# Patient Record
Sex: MALE | Race: WHITE | NOT HISPANIC OR LATINO | Employment: FULL TIME | URBAN - METROPOLITAN AREA
[De-identification: names, ages, dates, MRNs, and addresses within clinical notes are randomized per-mention and may not be internally consistent; named-entity substitution may affect disease eponyms.]

---

## 2023-02-14 PROBLEM — Z90.3 HISTORY OF SLEEVE GASTRECTOMY: Status: ACTIVE | Noted: 2023-02-14

## 2023-02-14 PROBLEM — E55.9 VITAMIN D DEFICIENCY: Status: ACTIVE | Noted: 2023-02-14

## 2023-02-14 PROBLEM — K21.9 GERD (GASTROESOPHAGEAL REFLUX DISEASE): Status: ACTIVE | Noted: 2023-02-14

## 2023-02-14 PROBLEM — R20.2 NUMBNESS AND TINGLING OF LOWER EXTREMITY: Status: ACTIVE | Noted: 2023-02-14

## 2023-02-14 PROBLEM — R20.0 NUMBNESS AND TINGLING OF LOWER EXTREMITY: Status: ACTIVE | Noted: 2023-02-14

## 2023-02-14 PROBLEM — J02.9 SORE THROAT: Status: ACTIVE | Noted: 2023-02-14

## 2023-02-14 PROBLEM — E66.01 MORBID OBESITY WITH BMI OF 40.0-44.9, ADULT (MULTI): Status: ACTIVE | Noted: 2023-02-14

## 2023-02-14 PROBLEM — J01.90 ACUTE SINUSITIS: Status: ACTIVE | Noted: 2023-02-14

## 2023-02-14 PROBLEM — I10 HTN (HYPERTENSION): Status: ACTIVE | Noted: 2023-02-14

## 2023-03-27 ENCOUNTER — OFFICE VISIT (OUTPATIENT)
Dept: PRIMARY CARE | Facility: CLINIC | Age: 43
End: 2023-03-27
Payer: COMMERCIAL

## 2023-03-27 ENCOUNTER — LAB (OUTPATIENT)
Dept: LAB | Facility: LAB | Age: 43
End: 2023-03-27
Payer: COMMERCIAL

## 2023-03-27 VITALS
RESPIRATION RATE: 20 BRPM | HEART RATE: 106 BPM | TEMPERATURE: 96.7 F | SYSTOLIC BLOOD PRESSURE: 138 MMHG | DIASTOLIC BLOOD PRESSURE: 70 MMHG | WEIGHT: 313.9 LBS | BODY MASS INDEX: 43.94 KG/M2 | HEIGHT: 71 IN

## 2023-03-27 DIAGNOSIS — Z13.1 SCREENING FOR DIABETES MELLITUS: Primary | ICD-10-CM

## 2023-03-27 DIAGNOSIS — F32.1 CURRENT MODERATE EPISODE OF MAJOR DEPRESSIVE DISORDER WITHOUT PRIOR EPISODE (MULTI): ICD-10-CM

## 2023-03-27 DIAGNOSIS — Z00.00 HEALTH MAINTENANCE EXAMINATION: ICD-10-CM

## 2023-03-27 DIAGNOSIS — Z00.00 WELL ADULT EXAM: ICD-10-CM

## 2023-03-27 DIAGNOSIS — Z13.220 SCREENING CHOLESTEROL LEVEL: ICD-10-CM

## 2023-03-27 DIAGNOSIS — Z13.1 SCREENING FOR DIABETES MELLITUS: ICD-10-CM

## 2023-03-27 DIAGNOSIS — N50.89 ENLARGED TESTICLE: ICD-10-CM

## 2023-03-27 DIAGNOSIS — Z23 IMMUNIZATION DUE: ICD-10-CM

## 2023-03-27 LAB
CHOLESTEROL (MG/DL) IN SER/PLAS: 202 MG/DL (ref 0–199)
CHOLESTEROL IN HDL (MG/DL) IN SER/PLAS: 46.8 MG/DL
CHOLESTEROL/HDL RATIO: 4.3
GLUCOSE (MG/DL) IN SER/PLAS: 88 MG/DL (ref 74–99)
LDL: 131 MG/DL (ref 0–99)
TRIGLYCERIDE (MG/DL) IN SER/PLAS: 123 MG/DL (ref 0–149)
VLDL: 25 MG/DL (ref 0–40)

## 2023-03-27 PROCEDURE — 99396 PREV VISIT EST AGE 40-64: CPT | Performed by: FAMILY MEDICINE

## 2023-03-27 PROCEDURE — 90715 TDAP VACCINE 7 YRS/> IM: CPT | Performed by: FAMILY MEDICINE

## 2023-03-27 PROCEDURE — 99213 OFFICE O/P EST LOW 20 MIN: CPT | Performed by: FAMILY MEDICINE

## 2023-03-27 PROCEDURE — 1036F TOBACCO NON-USER: CPT | Performed by: FAMILY MEDICINE

## 2023-03-27 PROCEDURE — 80323 ALKALOIDS NOS: CPT

## 2023-03-27 PROCEDURE — 90471 IMMUNIZATION ADMIN: CPT | Performed by: FAMILY MEDICINE

## 2023-03-27 PROCEDURE — 82947 ASSAY GLUCOSE BLOOD QUANT: CPT

## 2023-03-27 PROCEDURE — 80061 LIPID PANEL: CPT

## 2023-03-27 PROCEDURE — 3078F DIAST BP <80 MM HG: CPT | Performed by: FAMILY MEDICINE

## 2023-03-27 PROCEDURE — 36415 COLL VENOUS BLD VENIPUNCTURE: CPT

## 2023-03-27 PROCEDURE — 3075F SYST BP GE 130 - 139MM HG: CPT | Performed by: FAMILY MEDICINE

## 2023-03-27 RX ORDER — SERTRALINE HYDROCHLORIDE 50 MG/1
50 TABLET, FILM COATED ORAL DAILY
Qty: 30 TABLET | Refills: 1 | Status: SHIPPED | OUTPATIENT
Start: 2023-03-27 | End: 2023-04-27 | Stop reason: SDUPTHER

## 2023-03-27 RX ORDER — OMEPRAZOLE 20 MG/1
20 TABLET, DELAYED RELEASE ORAL
COMMUNITY

## 2023-03-27 ASSESSMENT — PATIENT HEALTH QUESTIONNAIRE - PHQ9
SUM OF ALL RESPONSES TO PHQ9 QUESTIONS 1 & 2: 3
4. FEELING TIRED OR HAVING LITTLE ENERGY: MORE THAN HALF THE DAYS
SUM OF ALL RESPONSES TO PHQ QUESTIONS 1-9: 17
2. FEELING DOWN, DEPRESSED OR HOPELESS: NOT AT ALL
5. POOR APPETITE OR OVEREATING: NEARLY EVERY DAY
3. TROUBLE FALLING OR STAYING ASLEEP: NEARLY EVERY DAY
7. TROUBLE CONCENTRATING ON THINGS, SUCH AS READING THE NEWSPAPER OR WATCHING TELEVISION: NOT AT ALL
10. IF YOU CHECKED OFF ANY PROBLEMS, HOW DIFFICULT HAVE THESE PROBLEMS MADE IT FOR YOU TO DO YOUR WORK, TAKE CARE OF THINGS AT HOME, OR GET ALONG WITH OTHER PEOPLE: NOT DIFFICULT AT ALL
6. FEELING BAD ABOUT YOURSELF - OR THAT YOU ARE A FAILURE OR HAVE LET YOURSELF OR YOUR FAMILY DOWN: NEARLY EVERY DAY
9. THOUGHTS THAT YOU WOULD BE BETTER OFF DEAD, OR OF HURTING YOURSELF: NEARLY EVERY DAY
1. LITTLE INTEREST OR PLEASURE IN DOING THINGS: NEARLY EVERY DAY
8. MOVING OR SPEAKING SO SLOWLY THAT OTHER PEOPLE COULD HAVE NOTICED. OR THE OPPOSITE, BEING SO FIGETY OR RESTLESS THAT YOU HAVE BEEN MOVING AROUND A LOT MORE THAN USUAL: NOT AT ALL

## 2023-03-27 ASSESSMENT — ENCOUNTER SYMPTOMS
THOUGHTS THAT DEATH WOULD BE EASIER: 1
ANHEDONIA: 1
DEPRESSION: 1

## 2023-03-27 NOTE — PROGRESS NOTES
"Subjective   Patient ID: Chuck Dunn is a 42 y.o. male who presents for Annual Exam.    Does not feel would act on thought and has had off and off whole life. No plan  Took Paxil and lithium when at 18 yo but not for a long time Has had depression sx on and off sincere including the thoughts of better off not being here but never a plan . Denies manic episodes. Feels left testicle larger since October  no pain    Well Adult Physical   Patient here for a comprehensive physical exam.The patient reports problems - depression and left testicle     Do you take any herbs or supplements that were not prescribed by a doctor? yes   Are you taking calcium supplements? no   Are you taking aspirin daily? no          Depression  Visit Type: initial  Onset of symptoms: more than 1 year ago  Progression since onset: waxing and waning  Patient presents with the following symptoms: anhedonia, suicidal ideas and thoughts of death.  Patient is not experiencing: suicidal planning.       waist circumference 48 inches    Review of Systems   Psychiatric/Behavioral:  Positive for depression and suicidal ideas.        Objective   /70   Pulse 106   Temp 35.9 °C (96.7 °F) (Temporal)   Resp 20   Ht 1.803 m (5' 11\")   Wt (!) 142 kg (313 lb 14.4 oz)   BMI 43.78 kg/m²     Physical Exam  Vitals and nursing note reviewed.   Constitutional:       Appearance: Normal appearance.   HENT:      Head: Normocephalic and atraumatic.      Right Ear: Tympanic membrane, ear canal and external ear normal.      Left Ear: Tympanic membrane, ear canal and external ear normal.      Nose: Nose normal.      Mouth/Throat:      Mouth: Mucous membranes are moist.   Eyes:      Conjunctiva/sclera: Conjunctivae normal.   Neck:      Thyroid: No thyroid mass or thyromegaly.   Cardiovascular:      Rate and Rhythm: Normal rate and regular rhythm.      Pulses: Normal pulses.      Heart sounds: Normal heart sounds.   Pulmonary:      Effort: Pulmonary effort " is normal.      Breath sounds: Normal breath sounds. No wheezing.   Abdominal:      General: Abdomen is flat. Bowel sounds are normal.      Palpations: Abdomen is soft. There is no mass.   Genitourinary:     Penis: Normal.       Testes:         Right: Tenderness or swelling not present.         Left: Swelling present.      Comments: Left larger right  Musculoskeletal:         General: Normal range of motion.      Cervical back: Neck supple.   Skin:     General: Skin is warm and dry.      Findings: No rash.   Neurological:      General: No focal deficit present.      Mental Status: He is alert and oriented to person, place, and time.   Psychiatric:         Mood and Affect: Mood normal.         Behavior: Behavior normal.         Thought Content: Thought content normal.         Judgment: Judgment normal.         Assessment/Plan   Problem List Items Addressed This Visit    None  Visit Diagnoses       Screening for diabetes mellitus    -  Primary    Relevant Orders    Glucose    Screening cholesterol level        Relevant Orders    Lipid Panel    Health maintenance examination        Relevant Orders    Nicotine + Metabolites, Urine    Well adult exam        Current moderate episode of major depressive disorder without prior episode (CMS/HCC)        Relevant Medications    sertraline (Zoloft) 50 mg tablet    Other Relevant Orders    Follow Up In [facility] - PCP    Immunization due        Relevant Orders    Tdap vaccine, age 10 years and older (BOOSTRIX) (Completed)    Enlarged testicle        Relevant Orders    US scrotum

## 2023-03-27 NOTE — PATIENT INSTRUCTIONS
Monitor your blood pressure and call if it more than 140/90.   High Blood Pressure: Eating Foods Low in Salt: Brief Version    If you have high blood pressure, cutting down on salt can help lower it. Sodium is the part of salt that causes the problems. You should have no more than 2300 milligrams of sodium a day. One teaspoon of salt has about 2300 milligrams of sodium.     Our taste for salt is mainly a habit. When you use less salt, your taste starts to change. After a while, food tastes better without salt than it did with it.     You can use less salt.     There are two main ways to use less salt:     Do not add salt to your foods.  Choose foods that have less salt. Read the labels on canned and prepared foods.      If you need to eat very little salt, you may need help in planning your meals. Talk to your health care provider or dietitian. Remember there are many healthy ways to add taste without adding salt.     You can use less salt by doing these things:     Read labels carefully. Look for any form of salt or sodium (another word for salt). Remember that baking soda, MSG, and baking powder have sodium, too. Do not use foods that have too much sodium.  Add very little or no salt to the food you make.  Do not add salt to food at the table.  Watch what you eat when you eat out. Fast foods are often very high in salt, as are many other restaurant foods.      When cooking or preparing foods, stay away from:     Ketchup, prepared mustard, pickles, and olives.  Soy sauce, steak or barbecue sauce, chili sauce, or Worcestershire sauce.  Bottled salad dressings.  Bouillon cubes.  Self-rising flour and biscuit mixes.      Don't eat foods high in salt, such as:     Cured meats or fish (for example, jain, luncheon meats, and canned sardines).  Canned vegetables, soups, and other packaged foods.  Cheeses and buttermilk.  Salted nuts and peanut butter.  Salted crackers, chips, popcorn, and pretzels.  Instant cooked  cereals.      You can get many of these foods with no or low salt. Read the labels. You may also want to try some of the many frozen low-salt and low-fat dinners.     Ask your health care provider about using salt substitutes. Many salt substitutes have potassium. You may need to watch how much potassium you use.     You can learn to cook without using salt.     You can be creative and make food look and taste great. It's a good idea to eat fresh foods as much as you can. Also, plain frozen fruits and vegetables usually do not have added salt.     Instead of salt, there are many kinds of things you can use to flavor your foods. You can try:     Mosley, cilantro, cumin, or george.  Thyme, gregory, bay leaf, or oregano.  Onions, garlic, mushrooms, or tomatoes.  Orange, lemon, lime juice, or wine.      Here are some other great suggestions:     If you use canned products, use the low-salt types. Rinse canned vegetables with tap water before cooking.  Use unsalted margarine instead of regular margarine or butter.  Eat tuna and salmon. Rinse first with running water.      Take care of yourself.     Find out more about eating healthy foods. You can:     Go to the library.  Look at your bookstore for low-salt cookbooks.  Remember to read food labels to find out how much salt and fat are used.      Take time to plan and enjoy your meals. It can be fun to learn to cook new dishes. And it's great to know that when you use less salt, you will lower your blood pressure

## 2023-04-01 LAB
3-OH-COTININE, URINE: <50 NG/ML
ANABASINE, URINE: <5 NG/ML
COTININE, URINE: <15 NG/ML
NICOTINE, URINE: <15 NG/ML

## 2023-04-27 ENCOUNTER — LAB (OUTPATIENT)
Dept: LAB | Facility: LAB | Age: 43
End: 2023-04-27
Payer: COMMERCIAL

## 2023-04-27 ENCOUNTER — OFFICE VISIT (OUTPATIENT)
Dept: PRIMARY CARE | Facility: CLINIC | Age: 43
End: 2023-04-27
Payer: COMMERCIAL

## 2023-04-27 VITALS
BODY MASS INDEX: 43.24 KG/M2 | RESPIRATION RATE: 16 BRPM | DIASTOLIC BLOOD PRESSURE: 108 MMHG | HEART RATE: 82 BPM | TEMPERATURE: 97.1 F | WEIGHT: 310 LBS | SYSTOLIC BLOOD PRESSURE: 150 MMHG

## 2023-04-27 DIAGNOSIS — F32.1 CURRENT MODERATE EPISODE OF MAJOR DEPRESSIVE DISORDER WITHOUT PRIOR EPISODE (MULTI): ICD-10-CM

## 2023-04-27 DIAGNOSIS — I10 PRIMARY HYPERTENSION: ICD-10-CM

## 2023-04-27 DIAGNOSIS — I10 PRIMARY HYPERTENSION: Primary | ICD-10-CM

## 2023-04-27 DIAGNOSIS — F32.0 CURRENT MILD EPISODE OF MAJOR DEPRESSIVE DISORDER WITHOUT PRIOR EPISODE (CMS-HCC): ICD-10-CM

## 2023-04-27 LAB
ALANINE AMINOTRANSFERASE (SGPT) (U/L) IN SER/PLAS: 42 U/L (ref 10–52)
ALBUMIN (G/DL) IN SER/PLAS: 4.6 G/DL (ref 3.4–5)
ALKALINE PHOSPHATASE (U/L) IN SER/PLAS: 44 U/L (ref 33–120)
ANION GAP IN SER/PLAS: 12 MMOL/L (ref 10–20)
ASPARTATE AMINOTRANSFERASE (SGOT) (U/L) IN SER/PLAS: 25 U/L (ref 9–39)
BILIRUBIN TOTAL (MG/DL) IN SER/PLAS: 0.6 MG/DL (ref 0–1.2)
CALCIUM (MG/DL) IN SER/PLAS: 9.8 MG/DL (ref 8.6–10.3)
CARBON DIOXIDE, TOTAL (MMOL/L) IN SER/PLAS: 31 MMOL/L (ref 21–32)
CHLORIDE (MMOL/L) IN SER/PLAS: 102 MMOL/L (ref 98–107)
CREATININE (MG/DL) IN SER/PLAS: 0.86 MG/DL (ref 0.5–1.3)
ERYTHROCYTE DISTRIBUTION WIDTH (RATIO) BY AUTOMATED COUNT: 13.2 % (ref 11.5–14.5)
ERYTHROCYTE MEAN CORPUSCULAR HEMOGLOBIN CONCENTRATION (G/DL) BY AUTOMATED: 32.2 G/DL (ref 32–36)
ERYTHROCYTE MEAN CORPUSCULAR VOLUME (FL) BY AUTOMATED COUNT: 92 FL (ref 80–100)
ERYTHROCYTES (10*6/UL) IN BLOOD BY AUTOMATED COUNT: 5.27 X10E12/L (ref 4.5–5.9)
GFR MALE: >90 ML/MIN/1.73M2
GLUCOSE (MG/DL) IN SER/PLAS: 90 MG/DL (ref 74–99)
HEMATOCRIT (%) IN BLOOD BY AUTOMATED COUNT: 48.4 % (ref 41–52)
HEMOGLOBIN (G/DL) IN BLOOD: 15.6 G/DL (ref 13.5–17.5)
LEUKOCYTES (10*3/UL) IN BLOOD BY AUTOMATED COUNT: 7.1 X10E9/L (ref 4.4–11.3)
PLATELETS (10*3/UL) IN BLOOD AUTOMATED COUNT: 252 X10E9/L (ref 150–450)
POTASSIUM (MMOL/L) IN SER/PLAS: 4.6 MMOL/L (ref 3.5–5.3)
PROTEIN TOTAL: 7.1 G/DL (ref 6.4–8.2)
SODIUM (MMOL/L) IN SER/PLAS: 140 MMOL/L (ref 136–145)
UREA NITROGEN (MG/DL) IN SER/PLAS: 13 MG/DL (ref 6–23)

## 2023-04-27 PROCEDURE — 85027 COMPLETE CBC AUTOMATED: CPT

## 2023-04-27 PROCEDURE — 3077F SYST BP >= 140 MM HG: CPT | Performed by: FAMILY MEDICINE

## 2023-04-27 PROCEDURE — 1036F TOBACCO NON-USER: CPT | Performed by: FAMILY MEDICINE

## 2023-04-27 PROCEDURE — 99214 OFFICE O/P EST MOD 30 MIN: CPT | Performed by: FAMILY MEDICINE

## 2023-04-27 PROCEDURE — 80053 COMPREHEN METABOLIC PANEL: CPT

## 2023-04-27 PROCEDURE — 3080F DIAST BP >= 90 MM HG: CPT | Performed by: FAMILY MEDICINE

## 2023-04-27 PROCEDURE — 36415 COLL VENOUS BLD VENIPUNCTURE: CPT

## 2023-04-27 RX ORDER — SERTRALINE HYDROCHLORIDE 50 MG/1
50 TABLET, FILM COATED ORAL DAILY
Qty: 90 TABLET | Refills: 1 | Status: SHIPPED | OUTPATIENT
Start: 2023-04-27 | End: 2023-09-05 | Stop reason: ALTCHOICE

## 2023-04-27 RX ORDER — LISINOPRIL AND HYDROCHLOROTHIAZIDE 10; 12.5 MG/1; MG/1
1 TABLET ORAL DAILY
Qty: 30 TABLET | Refills: 5 | Status: SHIPPED | OUTPATIENT
Start: 2023-04-27 | End: 2023-05-22 | Stop reason: SINTOL

## 2023-04-27 ASSESSMENT — ENCOUNTER SYMPTOMS: HYPERTENSION: 1

## 2023-04-27 NOTE — PROGRESS NOTES
Subjective   Patient ID: Toni Dunn is a 42 y.o. male who presents for Hypertension (Bp with home monitor = 176/112,  zoloft taking the edge off).    Doing well on sertraline and wants to stay on this dose  has been on BP meds in past before bariatric surgery in 2015,  BP high at home     Hypertension  This is a recurrent problem. The current episode started 1 to 4 weeks ago. The problem is unchanged. The problem is uncontrolled. Past treatments include ACE inhibitors and diuretics. There are no compliance problems.         Review of Systems    Objective   BP (!) 150/108 (BP Location: Right arm, Patient Position: Sitting, BP Cuff Size: Large adult)   Pulse 82   Temp 36.2 °C (97.1 °F) (Temporal)   Resp 16   Wt 141 kg (310 lb)   BMI 43.24 kg/m²     Physical Exam  Vitals and nursing note reviewed.   Constitutional:       General: He is not in acute distress.     Appearance: He is not ill-appearing.   HENT:      Head: Normocephalic and atraumatic.      Mouth/Throat:      Mouth: Mucous membranes are moist.   Eyes:      Conjunctiva/sclera: Conjunctivae normal.   Cardiovascular:      Rate and Rhythm: Normal rate and regular rhythm.      Heart sounds: Normal heart sounds.   Pulmonary:      Effort: Pulmonary effort is normal.      Breath sounds: Normal breath sounds.   Skin:     General: Skin is warm.   Neurological:      Mental Status: He is alert.   Psychiatric:         Mood and Affect: Mood normal.         Thought Content: Thought content normal.         Judgment: Judgment normal.         Assessment/Plan   Problem List Items Addressed This Visit          Circulatory    HTN (hypertension) - Primary    Relevant Medications    lisinopriL-hydrochlorothiazide 10-12.5 mg tablet    Other Relevant Orders    CBC    Comprehensive Metabolic Panel    Follow Up In [facility] - PCP       Other    Current mild episode of major depressive disorder (CMS/HCC)     Doing well on meds           Other Visit Diagnoses        Current moderate episode of major depressive disorder without prior episode (CMS/HCC)        Relevant Medications    sertraline (Zoloft) 50 mg tablet

## 2023-05-22 ENCOUNTER — OFFICE VISIT (OUTPATIENT)
Dept: PRIMARY CARE | Facility: CLINIC | Age: 43
End: 2023-05-22
Payer: COMMERCIAL

## 2023-05-22 VITALS
HEART RATE: 88 BPM | RESPIRATION RATE: 12 BRPM | DIASTOLIC BLOOD PRESSURE: 88 MMHG | TEMPERATURE: 97.8 F | BODY MASS INDEX: 44 KG/M2 | WEIGHT: 315 LBS | SYSTOLIC BLOOD PRESSURE: 118 MMHG

## 2023-05-22 DIAGNOSIS — I10 PRIMARY HYPERTENSION: ICD-10-CM

## 2023-05-22 DIAGNOSIS — F32.0 CURRENT MILD EPISODE OF MAJOR DEPRESSIVE DISORDER WITHOUT PRIOR EPISODE (CMS-HCC): Primary | ICD-10-CM

## 2023-05-22 PROCEDURE — 3079F DIAST BP 80-89 MM HG: CPT | Performed by: FAMILY MEDICINE

## 2023-05-22 PROCEDURE — 1036F TOBACCO NON-USER: CPT | Performed by: FAMILY MEDICINE

## 2023-05-22 PROCEDURE — 3074F SYST BP LT 130 MM HG: CPT | Performed by: FAMILY MEDICINE

## 2023-05-22 PROCEDURE — 99213 OFFICE O/P EST LOW 20 MIN: CPT | Performed by: FAMILY MEDICINE

## 2023-05-22 RX ORDER — LOSARTAN POTASSIUM AND HYDROCHLOROTHIAZIDE 12.5; 5 MG/1; MG/1
1 TABLET ORAL DAILY
Qty: 30 TABLET | Refills: 2 | Status: SHIPPED | OUTPATIENT
Start: 2023-05-22 | End: 2023-06-12

## 2023-05-22 ASSESSMENT — PATIENT HEALTH QUESTIONNAIRE - PHQ9
1. LITTLE INTEREST OR PLEASURE IN DOING THINGS: NEARLY EVERY DAY
6. FEELING BAD ABOUT YOURSELF - OR THAT YOU ARE A FAILURE OR HAVE LET YOURSELF OR YOUR FAMILY DOWN: NEARLY EVERY DAY
2. FEELING DOWN, DEPRESSED OR HOPELESS: NEARLY EVERY DAY
10. IF YOU CHECKED OFF ANY PROBLEMS, HOW DIFFICULT HAVE THESE PROBLEMS MADE IT FOR YOU TO DO YOUR WORK, TAKE CARE OF THINGS AT HOME, OR GET ALONG WITH OTHER PEOPLE: NOT DIFFICULT AT ALL
SUM OF ALL RESPONSES TO PHQ9 QUESTIONS 1 & 2: 6
SUM OF ALL RESPONSES TO PHQ QUESTIONS 1-9: 17
7. TROUBLE CONCENTRATING ON THINGS, SUCH AS READING THE NEWSPAPER OR WATCHING TELEVISION: SEVERAL DAYS
3. TROUBLE FALLING OR STAYING ASLEEP: NOT AT ALL
8. MOVING OR SPEAKING SO SLOWLY THAT OTHER PEOPLE COULD HAVE NOTICED. OR THE OPPOSITE, BEING SO FIGETY OR RESTLESS THAT YOU HAVE BEEN MOVING AROUND A LOT MORE THAN USUAL: NOT AT ALL
9. THOUGHTS THAT YOU WOULD BE BETTER OFF DEAD, OR OF HURTING YOURSELF: SEVERAL DAYS
5. POOR APPETITE OR OVEREATING: NEARLY EVERY DAY
4. FEELING TIRED OR HAVING LITTLE ENERGY: NEARLY EVERY DAY

## 2023-05-22 NOTE — PROGRESS NOTES
Subjective   Patient ID: Toni Dunn is a 42 y.o. male who presents for Medication Visit (Started lisinopril last month, URI s/s but feels much better today).    Having ED issues which he feels is from lisinopril          Review of Systems    Objective   /88 (BP Location: Right arm, Patient Position: Sitting, BP Cuff Size: Large adult)   Pulse 88   Temp 36.6 °C (97.8 °F) (Temporal)   Resp 12   Wt 143 kg (315 lb 8 oz)   BMI 44.00 kg/m²     Physical Exam  Vitals and nursing note reviewed.   Constitutional:       General: He is not in acute distress.     Appearance: He is not ill-appearing.   HENT:      Head: Normocephalic and atraumatic.      Mouth/Throat:      Mouth: Mucous membranes are moist.   Eyes:      Conjunctiva/sclera: Conjunctivae normal.   Cardiovascular:      Rate and Rhythm: Normal rate and regular rhythm.      Heart sounds: Normal heart sounds.   Pulmonary:      Effort: Pulmonary effort is normal.      Breath sounds: Normal breath sounds.   Skin:     General: Skin is warm.   Neurological:      Mental Status: He is alert.   Psychiatric:         Mood and Affect: Mood normal.         Thought Content: Thought content normal.         Judgment: Judgment normal.         Assessment/Plan   Problem List Items Addressed This Visit          Circulatory    HTN (hypertension)    Relevant Medications    losartan-hydrochlorothiazide (Hyzaar) 50-12.5 mg tablet       Other    Current mild episode of major depressive disorder (CMS/HCC) - Primary

## 2023-06-12 ENCOUNTER — OFFICE VISIT (OUTPATIENT)
Dept: PRIMARY CARE | Facility: CLINIC | Age: 43
End: 2023-06-12
Payer: COMMERCIAL

## 2023-06-12 VITALS
BODY MASS INDEX: 44.52 KG/M2 | TEMPERATURE: 97.3 F | RESPIRATION RATE: 16 BRPM | HEART RATE: 92 BPM | DIASTOLIC BLOOD PRESSURE: 90 MMHG | SYSTOLIC BLOOD PRESSURE: 122 MMHG | WEIGHT: 315 LBS

## 2023-06-12 DIAGNOSIS — I10 PRIMARY HYPERTENSION: ICD-10-CM

## 2023-06-12 PROCEDURE — 3080F DIAST BP >= 90 MM HG: CPT | Performed by: FAMILY MEDICINE

## 2023-06-12 PROCEDURE — 1036F TOBACCO NON-USER: CPT | Performed by: FAMILY MEDICINE

## 2023-06-12 PROCEDURE — 3074F SYST BP LT 130 MM HG: CPT | Performed by: FAMILY MEDICINE

## 2023-06-12 PROCEDURE — 99213 OFFICE O/P EST LOW 20 MIN: CPT | Performed by: FAMILY MEDICINE

## 2023-06-12 RX ORDER — LOSARTAN POTASSIUM AND HYDROCHLOROTHIAZIDE 12.5; 1 MG/1; MG/1
1 TABLET ORAL DAILY
Qty: 30 TABLET | Refills: 2 | Status: SHIPPED | OUTPATIENT
Start: 2023-06-12 | End: 2023-07-14 | Stop reason: SDUPTHER

## 2023-06-12 ASSESSMENT — PATIENT HEALTH QUESTIONNAIRE - PHQ9
6. FEELING BAD ABOUT YOURSELF - OR THAT YOU ARE A FAILURE OR HAVE LET YOURSELF OR YOUR FAMILY DOWN: NEARLY EVERY DAY
SUM OF ALL RESPONSES TO PHQ9 QUESTIONS 1 & 2: 4
3. TROUBLE FALLING OR STAYING ASLEEP: NOT AT ALL
9. THOUGHTS THAT YOU WOULD BE BETTER OFF DEAD, OR OF HURTING YOURSELF: SEVERAL DAYS
4. FEELING TIRED OR HAVING LITTLE ENERGY: SEVERAL DAYS
2. FEELING DOWN, DEPRESSED OR HOPELESS: SEVERAL DAYS
10. IF YOU CHECKED OFF ANY PROBLEMS, HOW DIFFICULT HAVE THESE PROBLEMS MADE IT FOR YOU TO DO YOUR WORK, TAKE CARE OF THINGS AT HOME, OR GET ALONG WITH OTHER PEOPLE: NOT DIFFICULT AT ALL
1. LITTLE INTEREST OR PLEASURE IN DOING THINGS: NEARLY EVERY DAY
8. MOVING OR SPEAKING SO SLOWLY THAT OTHER PEOPLE COULD HAVE NOTICED. OR THE OPPOSITE, BEING SO FIGETY OR RESTLESS THAT YOU HAVE BEEN MOVING AROUND A LOT MORE THAN USUAL: NOT AT ALL
SUM OF ALL RESPONSES TO PHQ QUESTIONS 1-9: 12
5. POOR APPETITE OR OVEREATING: NEARLY EVERY DAY
7. TROUBLE CONCENTRATING ON THINGS, SUCH AS READING THE NEWSPAPER OR WATCHING TELEVISION: NOT AT ALL

## 2023-06-12 NOTE — PROGRESS NOTES
Subjective   Patient ID: Toni Dunn is a 42 y.o. male who presents for Hypertension (At  last visit started losartan / hydrochlorothiazide denies any proplems).    HPI     Review of Systems    Objective   /90 (BP Location: Right arm, Patient Position: Sitting, BP Cuff Size: Large adult)   Pulse 92   Temp 36.3 °C (97.3 °F) (Temporal)   Resp 16   Wt 145 kg (319 lb 3.2 oz)   BMI 44.52 kg/m²     Physical Exam  Vitals and nursing note reviewed.   Constitutional:       General: He is not in acute distress.     Appearance: He is not ill-appearing.   HENT:      Head: Normocephalic and atraumatic.      Mouth/Throat:      Mouth: Mucous membranes are moist.   Eyes:      Conjunctiva/sclera: Conjunctivae normal.   Cardiovascular:      Rate and Rhythm: Normal rate and regular rhythm.      Heart sounds: Normal heart sounds.   Pulmonary:      Effort: Pulmonary effort is normal.      Breath sounds: Normal breath sounds.   Skin:     General: Skin is warm.   Neurological:      Mental Status: He is alert.   Psychiatric:         Mood and Affect: Mood normal.         Thought Content: Thought content normal.         Judgment: Judgment normal.         Assessment/Plan   Problem List Items Addressed This Visit          Circulatory    HTN (hypertension)    Relevant Medications    losartan-hydrochlorothiazide (Hyzaar) 100-12.5 mg tablet    Other Relevant Orders    Follow Up [facility] - PCP

## 2023-07-14 ENCOUNTER — OFFICE VISIT (OUTPATIENT)
Dept: PRIMARY CARE | Facility: CLINIC | Age: 43
End: 2023-07-14
Payer: COMMERCIAL

## 2023-07-14 VITALS
WEIGHT: 311.4 LBS | RESPIRATION RATE: 16 BRPM | DIASTOLIC BLOOD PRESSURE: 78 MMHG | TEMPERATURE: 97.3 F | HEIGHT: 71 IN | HEART RATE: 76 BPM | SYSTOLIC BLOOD PRESSURE: 130 MMHG | BODY MASS INDEX: 43.6 KG/M2

## 2023-07-14 DIAGNOSIS — I10 PRIMARY HYPERTENSION: ICD-10-CM

## 2023-07-14 DIAGNOSIS — F32.0 CURRENT MILD EPISODE OF MAJOR DEPRESSIVE DISORDER WITHOUT PRIOR EPISODE (CMS-HCC): Primary | ICD-10-CM

## 2023-07-14 PROBLEM — J02.9 SORE THROAT: Status: RESOLVED | Noted: 2023-02-14 | Resolved: 2023-07-14

## 2023-07-14 PROBLEM — R20.2 NUMBNESS AND TINGLING OF LOWER EXTREMITY: Status: RESOLVED | Noted: 2023-02-14 | Resolved: 2023-07-14

## 2023-07-14 PROBLEM — R20.0 NUMBNESS AND TINGLING OF LOWER EXTREMITY: Status: RESOLVED | Noted: 2023-02-14 | Resolved: 2023-07-14

## 2023-07-14 PROBLEM — J01.90 ACUTE SINUSITIS: Status: RESOLVED | Noted: 2023-02-14 | Resolved: 2023-07-14

## 2023-07-14 PROCEDURE — 3075F SYST BP GE 130 - 139MM HG: CPT | Performed by: FAMILY MEDICINE

## 2023-07-14 PROCEDURE — 3078F DIAST BP <80 MM HG: CPT | Performed by: FAMILY MEDICINE

## 2023-07-14 PROCEDURE — 1036F TOBACCO NON-USER: CPT | Performed by: FAMILY MEDICINE

## 2023-07-14 PROCEDURE — 99213 OFFICE O/P EST LOW 20 MIN: CPT | Performed by: FAMILY MEDICINE

## 2023-07-14 RX ORDER — LOSARTAN POTASSIUM AND HYDROCHLOROTHIAZIDE 12.5; 1 MG/1; MG/1
1 TABLET ORAL DAILY
Qty: 90 TABLET | Refills: 1 | Status: SHIPPED | OUTPATIENT
Start: 2023-07-14 | End: 2024-01-15 | Stop reason: SDUPTHER

## 2023-07-14 NOTE — LETTER
July 14, 2023     Patient: Chuck Dunn   YOB: 1980   Date of Visit: 7/14/2023       To Whom It May Concern:    Chuck Dunn was seen in my clinic on 7/14/2023 at 9:20 am. Please excuse Chuck from work next week 7/17/2023-7/21/2023 for medical reasons.     If you have any questions or concerns, please don't hesitate to call.         Sincerely,         Thania Elmore MD        CC: No Recipients   Griseofulvin Counseling:  I discussed with the patient the risks of griseofulvin including but not limited to photosensitivity, cytopenia, liver damage, nausea/vomiting and severe allergy.  The patient understands that this medication is best absorbed when taken with a fatty meal (e.g., ice cream or french fries).

## 2023-07-14 NOTE — PROGRESS NOTES
"Subjective   Patient ID: Toni Dunn is a 42 y.o. male who presents for Hypertension (One month HTN recheck added Losartan-hydrochlorothiazide 100-12.5mg).    Having more stress at home and wants a week off of work  to work on it.         Review of Systems    Objective   /78   Pulse 76   Temp 36.3 °C (97.3 °F)   Resp 16   Ht 1.803 m (5' 11\")   Wt 141 kg (311 lb 6.4 oz)   BMI 43.43 kg/m²     Physical Exam  Vitals and nursing note reviewed.   Constitutional:       General: He is not in acute distress.     Appearance: He is not ill-appearing.   HENT:      Head: Normocephalic and atraumatic.      Mouth/Throat:      Mouth: Mucous membranes are moist.   Eyes:      Conjunctiva/sclera: Conjunctivae normal.   Cardiovascular:      Rate and Rhythm: Normal rate and regular rhythm.      Heart sounds: Normal heart sounds.   Pulmonary:      Effort: Pulmonary effort is normal.      Breath sounds: Normal breath sounds.   Skin:     General: Skin is warm.   Neurological:      Mental Status: He is alert.   Psychiatric:         Mood and Affect: Mood normal.         Thought Content: Thought content normal.         Judgment: Judgment normal.         Assessment/Plan   Problem List Items Addressed This Visit       HTN (hypertension)    Relevant Medications    losartan-hydrochlorothiazide (Hyzaar) 100-12.5 mg tablet    Other Relevant Orders    Follow Up [facility] - PCP - Established    Current mild episode of major depressive disorder (CMS/HCC) - Primary     Gave work excuse for next week due to stress at home but wants to keep sertraline the same                "

## 2023-08-14 DIAGNOSIS — N50.89 ENLARGED TESTICLE: Primary | ICD-10-CM

## 2023-08-21 DIAGNOSIS — N50.89 ENLARGED TESTICLE: Primary | ICD-10-CM

## 2023-09-05 ENCOUNTER — OFFICE VISIT (OUTPATIENT)
Dept: PRIMARY CARE | Facility: CLINIC | Age: 43
End: 2023-09-05
Payer: COMMERCIAL

## 2023-09-05 VITALS
HEIGHT: 71 IN | HEART RATE: 78 BPM | BODY MASS INDEX: 44.1 KG/M2 | RESPIRATION RATE: 18 BRPM | DIASTOLIC BLOOD PRESSURE: 76 MMHG | WEIGHT: 315 LBS | TEMPERATURE: 97.7 F | SYSTOLIC BLOOD PRESSURE: 130 MMHG

## 2023-09-05 DIAGNOSIS — K40.90 NON-RECURRENT UNILATERAL INGUINAL HERNIA WITHOUT OBSTRUCTION OR GANGRENE: Primary | ICD-10-CM

## 2023-09-05 DIAGNOSIS — J20.9 ACUTE BRONCHITIS, UNSPECIFIED ORGANISM: ICD-10-CM

## 2023-09-05 PROCEDURE — 3078F DIAST BP <80 MM HG: CPT | Performed by: FAMILY MEDICINE

## 2023-09-05 PROCEDURE — 3075F SYST BP GE 130 - 139MM HG: CPT | Performed by: FAMILY MEDICINE

## 2023-09-05 PROCEDURE — 1036F TOBACCO NON-USER: CPT | Performed by: FAMILY MEDICINE

## 2023-09-05 PROCEDURE — 99213 OFFICE O/P EST LOW 20 MIN: CPT | Performed by: FAMILY MEDICINE

## 2023-09-05 RX ORDER — AZITHROMYCIN 250 MG/1
TABLET, FILM COATED ORAL
Qty: 6 TABLET | Refills: 0 | Status: SHIPPED | OUTPATIENT
Start: 2023-09-05 | End: 2023-09-10

## 2023-09-05 ASSESSMENT — ENCOUNTER SYMPTOMS: COUGH: 1

## 2023-09-05 NOTE — PROGRESS NOTES
"Subjective   Patient ID: Toni Dunn is a 42 y.o. male who presents for URI (Patient has had symptoms for 3 days. Woke up this morning with it more in his chest. Wet cough with green mucus. ).    URI   The current episode started in the past 7 days. There has been no fever. Associated symptoms include congestion and coughing.        Review of Systems   HENT:  Positive for congestion.    Respiratory:  Positive for cough.        Objective   /76   Pulse 78   Temp 36.5 °C (97.7 °F)   Resp 18   Ht 1.803 m (5' 11\")   Wt 147 kg (323 lb 12.8 oz)   BMI 45.16 kg/m²     Physical Exam  Vitals and nursing note reviewed.   Constitutional:       General: He is not in acute distress.     Appearance: Normal appearance. He is not ill-appearing.   HENT:      Head: Normocephalic and atraumatic.      Right Ear: Tympanic membrane, ear canal and external ear normal. Tympanic membrane is not perforated, erythematous, retracted or bulging.      Left Ear: Tympanic membrane, ear canal and external ear normal. Tympanic membrane is not perforated, erythematous, retracted or bulging.      Mouth/Throat:      Mouth: Mucous membranes are moist.      Pharynx: Oropharynx is clear.   Eyes:      General:         Right eye: No hordeolum.         Left eye: No hordeolum.      Conjunctiva/sclera: Conjunctivae normal.      Right eye: Right conjunctiva is not injected. No exudate.     Left eye: Left conjunctiva is not injected. No exudate.  Cardiovascular:      Rate and Rhythm: Normal rate and regular rhythm.   Pulmonary:      Effort: Pulmonary effort is normal. No tachypnea.      Breath sounds: Normal breath sounds. No decreased air movement.   Lymphadenopathy:      Cervical:      Right cervical: No superficial cervical adenopathy.     Left cervical: No superficial cervical adenopathy.   Skin:     General: Skin is warm.      Findings: No rash.   Neurological:      Mental Status: He is alert.   Psychiatric:         Mood and Affect: Mood and " affect normal.         Assessment/Plan   Problem List Items Addressed This Visit    None  Visit Diagnoses       Non-recurrent unilateral inguinal hernia without obstruction or gangrene    -  Primary    Relevant Orders    Referral to General Surgery    Acute bronchitis, unspecified organism        Relevant Medications    azithromycin (Zithromax) 250 mg tablet

## 2023-10-23 ENCOUNTER — LAB (OUTPATIENT)
Dept: LAB | Facility: LAB | Age: 43
End: 2023-10-23
Payer: COMMERCIAL

## 2023-10-23 ENCOUNTER — CLINICAL SUPPORT (OUTPATIENT)
Dept: PREADMISSION TESTING | Facility: HOSPITAL | Age: 43
End: 2023-10-23
Payer: COMMERCIAL

## 2023-10-23 VITALS
DIASTOLIC BLOOD PRESSURE: 100 MMHG | HEART RATE: 87 BPM | BODY MASS INDEX: 44.1 KG/M2 | RESPIRATION RATE: 16 BRPM | OXYGEN SATURATION: 94 % | SYSTOLIC BLOOD PRESSURE: 155 MMHG | WEIGHT: 315 LBS | TEMPERATURE: 97.9 F | HEIGHT: 71 IN

## 2023-10-23 DIAGNOSIS — Z01.818 PREOP TESTING: ICD-10-CM

## 2023-10-23 DIAGNOSIS — Z01.818 PREOP TESTING: Primary | ICD-10-CM

## 2023-10-23 DIAGNOSIS — K40.90 LEFT INGUINAL HERNIA: ICD-10-CM

## 2023-10-23 LAB
ANION GAP SERPL CALC-SCNC: 9 MMOL/L (ref 10–20)
BUN SERPL-MCNC: 8 MG/DL (ref 6–23)
CALCIUM SERPL-MCNC: 9.3 MG/DL (ref 8.6–10.3)
CHLORIDE SERPL-SCNC: 106 MMOL/L (ref 98–107)
CO2 SERPL-SCNC: 31 MMOL/L (ref 21–32)
CREAT SERPL-MCNC: 0.74 MG/DL (ref 0.5–1.3)
GFR SERPL CREATININE-BSD FRML MDRD: >90 ML/MIN/1.73M*2
GLUCOSE SERPL-MCNC: 92 MG/DL (ref 74–99)
POTASSIUM SERPL-SCNC: 4.2 MMOL/L (ref 3.5–5.3)
SODIUM SERPL-SCNC: 142 MMOL/L (ref 136–145)

## 2023-10-23 PROCEDURE — 99202 OFFICE O/P NEW SF 15 MIN: CPT | Performed by: NURSE PRACTITIONER

## 2023-10-23 PROCEDURE — 80048 BASIC METABOLIC PNL TOTAL CA: CPT

## 2023-10-23 PROCEDURE — 36415 COLL VENOUS BLD VENIPUNCTURE: CPT

## 2023-10-23 RX ORDER — BISMUTH SUBSALICYLATE 262 MG
1 TABLET,CHEWABLE ORAL DAILY
COMMUNITY

## 2023-10-23 RX ORDER — CALCIUM CARBONATE 500(1250)
2 TABLET ORAL DAILY
COMMUNITY
End: 2024-05-17 | Stop reason: ENTERED-IN-ERROR

## 2023-10-23 RX ORDER — ACETAMINOPHEN 500 MG
5000 TABLET ORAL DAILY
COMMUNITY
End: 2024-05-17 | Stop reason: ENTERED-IN-ERROR

## 2023-10-23 ASSESSMENT — DUKE ACTIVITY SCORE INDEX (DASI)
CAN YOU WALK INDOORS, SUCH AS AROUND YOUR HOUSE: YES
CAN YOU DO MODERATE WORK AROUND THE HOUSE LIKE VACUUMING, SWEEPING FLOORS OR CARRYING GROCERIES: YES
CAN YOU TAKE CARE OF YOURSELF (EAT, DRESS, BATHE, OR USE TOILET): YES
CAN YOU PARTICIPATE IN MODERATE RECREATIONAL ACTIVITIES LIKE GOLF, BOWLING, DANCING, DOUBLES TENNIS OR THROWING A BASEBALL OR FOOTBALL: NO
CAN YOU RUN A SHORT DISTANCE: NO
CAN YOU DO LIGHT WORK AROUND THE HOUSE LIKE DUSTING OR WASHING DISHES: YES
DASI METS SCORE: 6.9
CAN YOU DO HEAVY WORK AROUND THE HOUSE LIKE SCRUBBING FLOORS OR LIFTING AND MOVING HEAVY FURNITURE: YES
CAN YOU HAVE SEXUAL RELATIONS: YES
CAN YOU CLIMB A FLIGHT OF STAIRS OR WALK UP A HILL: YES
CAN YOU PARTICIPATE IN STRENOUS SPORTS LIKE SWIMMING, SINGLES TENNIS, FOOTBALL, BASKETBALL, OR SKIING: NO
CAN YOU DO YARD WORK LIKE RAKING LEAVES, WEEDING OR PUSHING A MOWER: YES
TOTAL_SCORE: 33.95
CAN YOU WALK A BLOCK OR TWO ON LEVEL GROUND: NO

## 2023-10-23 ASSESSMENT — ENCOUNTER SYMPTOMS
RESPIRATORY NEGATIVE: 1
CARDIOVASCULAR NEGATIVE: 1
ROS SKIN COMMENTS: ECZEMA
PSYCHIATRIC NEGATIVE: 1
GASTROINTESTINAL NEGATIVE: 1
ARTHRALGIAS: 1
NEUROLOGICAL NEGATIVE: 1
CONSTITUTIONAL NEGATIVE: 1
DYSURIA: 0
EYES NEGATIVE: 1
DIFFICULTY URINATING: 0

## 2023-10-23 ASSESSMENT — CHADS2 SCORE
AGE GREATER THAN OR EQUAL TO 75: NO
PRIOR STROKE OR TIA OR THROMBOEMBOLISM: NO
HYPERTENSION: YES
CHF: NO
DIABETES: NO
CHADS2 SCORE: 1

## 2023-10-23 ASSESSMENT — PAIN - FUNCTIONAL ASSESSMENT: PAIN_FUNCTIONAL_ASSESSMENT: 0-10

## 2023-10-23 ASSESSMENT — ACTIVITIES OF DAILY LIVING (ADL): ADL_SCORE: 0

## 2023-10-23 ASSESSMENT — PAIN SCALES - GENERAL: PAINLEVEL_OUTOF10: 0 - NO PAIN

## 2023-10-23 NOTE — CPM/PAT H&P
CPM/PAT Evaluation       Name: Chuck Dunn (Toni Dunn)  /Age: 1980/42 y.o.     In-Person       Chief Complaint: left hernia    HPIPatient with increasing left scrotum swelling over the last year. Diagnosed with left inguinal hernia. Denies pain to area. Denies problems urinating.     Past Medical History:   Diagnosis Date    Anxiety     Depression     GERD (gastroesophageal reflux disease)     Hypertension    Arthritis and left sciatica    Past Surgical History:   Procedure Laterality Date    OTHER SURGICAL HISTORY  2021    Sleeve gastrectomy       Patient  reports being sexually active.    Family History   Problem Relation Name Age of Onset    Other (Diabetes mellitus) Mother      Heart disease Mother      Diabetes Mother         Allergies   Allergen Reactions    Bee Venom Protein (Honey Bee) Swelling    Venom-Wasp Swelling       Prior to Admission medications    Medication Sig Start Date End Date Taking? Authorizing Provider   losartan-hydrochlorothiazide (Hyzaar) 100-12.5 mg tablet Take 1 tablet by mouth once daily. 7/14/23 1/10/24  Thania Elmore MD   omeprazole OTC (PriLOSEC OTC) 20 mg EC tablet Take 1 tablet (20 mg) by mouth once daily in the morning. Take before meals. Do not crush, chew, or split.    Historical Provider, MD        Review of Systems   Constitutional: Negative.    HENT: Negative.     Eyes: Negative.         Glasses   Respiratory: Negative.     Cardiovascular: Negative.    Gastrointestinal: Negative.    Genitourinary:  Positive for scrotal swelling. Negative for difficulty urinating and dysuria.   Musculoskeletal:  Positive for arthralgias.   Skin:         eczema   Neurological: Negative.    Psychiatric/Behavioral: Negative.          Physical Exam  Constitutional:       Appearance: Normal appearance.   HENT:      Head: Normocephalic.   Eyes:      Extraocular Movements: Extraocular movements intact.   Cardiovascular:      Rate and Rhythm: Normal rate and regular  rhythm.      Heart sounds: Normal heart sounds.   Pulmonary:      Effort: Pulmonary effort is normal.      Breath sounds: Normal breath sounds.   Abdominal:      General: Bowel sounds are normal.      Palpations: Abdomen is soft.   Musculoskeletal:         General: Normal range of motion.      Cervical back: Normal range of motion.   Skin:     General: Skin is warm and dry.   Neurological:      Mental Status: He is alert and oriented to person, place, and time.   Psychiatric:         Mood and Affect: Mood normal.          PAT AIRWAY:   Airway:     Neck ROM::  Full   Denies dental problems    Anesthesia:  Patient denies any anesthesia complications.     Visit Vitals  BP (!) 155/100   Pulse 87   Temp 36.6 °C (97.9 °F) (Temporal)   Resp 16       DASI Risk Score      Flowsheet Row Most Recent Value   DASI SCORE 33.95   METS Score (Will be calculated only when all the questions are answered) 6.9          Caprini DVT Assessment      Flowsheet Row Most Recent Value   DVT Score 6   Current Status Minor surgery planned   History Prior major surgery   Age 40-59 years   BMI 41-50 (Morbid obesity)          Modified Frailty Index      Flowsheet Row Most Recent Value   Modified Frailty Index Calculator .0909          CHADS2 Stroke Risk  Current as of 25 minutes ago        N/A 3 - 100%: High Risk   2 - 3%: Medium Risk   0 - 2%: Low Risk     Last Change: N/A          This score determines the patient's risk of having a stroke if the patient has atrial fibrillation.        This score is not applicable to this patient. Components are not calculated.          Revised Cardiac Risk Index      Flowsheet Row Most Recent Value   Revised Cardiac Risk Calculator 0          Apfel Simplified Score    No data to display       Risk Analysis Index Results This Encounter         10/23/2023  0923             GOLD Cancer History: Patient does not indicate history of cancer    Total Risk Analysis Index Score Without Cancer: 13    Total Risk Analysis  Index Score: 13          Stop Bang Score      Flowsheet Row Most Recent Value   Do you snore loudly? 1   Do you often feel tired or fatigued after your sleep? 1   Has anyone ever observed you stop breathing in your sleep? 0   Do you have or are you being treated for high blood pressure? 1   Recent BMI (Calculated) 45.2   Is BMI greater than 35 kg/m2? 1=Yes   Age older than 50 years old? 0=No   Is your neck circumference greater than 17 inches (Male) or 16 inches (Female)? 1            Assessment and Plan:     42 year old male for left inguinal hernia repair  11/8/23  BMP today  Smokes 4 cigarettes daily (lately has had life stressors), smoking cessation discussed

## 2023-10-23 NOTE — PREPROCEDURE INSTRUCTIONS
Medication List            Accurate as of October 23, 2023  9:48 AM. Always use your most recent med list.                losartan-hydrochlorothiazide 100-12.5 mg tablet  Commonly known as: Hyzaar  Take 1 tablet by mouth once daily.  Medication Adjustments for Surgery: Stop 1 day before surgery     omeprazole OTC 20 mg EC tablet  Commonly known as: PriLOSEC OTC  Medication Adjustments for Surgery: Take morning of surgery with sip of water, no other fluids            PRE-OPERATIVE INSTRUCTIONS    You will receive notification one business day prior to your surgery to confirm your arrival time and additional information. It is important that you answer your phone and/or check your messages during this time.    Please enter the building through the Outpatient entrance. Take the elevator off the lobby to the 2nd floor and check in at the Outpatient Surgery desk    INSTRUCTIONS:  Talk to your surgeon for instructions if you should stop your aspirin, blood thinner, or diabetes medicines.  DO NOT take any multivitamins or over the counter supplements for 7-10 days before surgery.  If not being admitted, you must have an adult immediately available to drive you home after surgery. We also highly recommend you have someone stay with you for the entire day and night of your surgery.  For children having surgery, a parent or legal guardian must accompany t hem to the surgery center. If this is not possible, please call [phone] to make additional arrangements.  For adults who are unable to consent or make medical decisions for themselves, a legal guardian or Power of  must accompany them to the surgery center. If this is not possible, please call [phone] to make additional arrangements.  Wear comfortable, loose fitting clothing.  All jewelry and piercings must be removed. If you are unable to remove an item or have a dermal piercing, please be sure to tell the nurse when you arrive for surgery.  Nail polish  and make-up must be removed.  Avoid smoking or consuming alcohol for 24 hours before surgery.  To help prevent infection, please take a shower/bath and wash your hair the night before and/or morning of surgery.    Additional instructions about eating and drinking before surgery:  Do not eat any solid foods or drink anything but clear liquids within 6 hours of your arrival time for surgery. Milk, nutritional drinks/supplements, and infant formula are considered solid foods.  You may drink up to 12 oz. of clear liquids up to 2 hours before your arrival time for surgery, unless directed otherwise by your surgeon. Clear liquids include water, non-carbonated sports drinks (Gatorade), black tea or coffee (no creamers) and breast milk.    If you have any questions or concerns, please call Pre-Admission Testing at [phone].      Preoperative Bathing instructions    Follow these instructions the evening before and morning of surgery:  Do not shave the day before or day of surgery.  Remove all jewelry until after surgery. Take off rings and take out all body-piercing jewelry.  Use a clean wash cloth and towel.  Wash your face and hair with your normal soap and shampoo before you use the CHG soap.  Use a wash cloth to clean your skin with the CHG soap. Use enough CHG soap to cover the skin on your whole body. Use the same amount as you would with your normal soap.  Do not use the CHG soap on your face, eyes, ears, or head.  Do not scrub your skin too hard.  Be sure to clean the area well where surgery will be done.  Do not wash with your normal soap after the CHG soap.  Keep away from the water stream when you put CHG soap on. This keeps it from rinsing off too soon.  Rinse your body well.  Pat yourself dry with a clean, soft towel.  Put on clean clothing.  Do not put on any deodorants, lotions, or oils after showering. These might block how the CHG soap works.

## 2023-11-06 RX ORDER — ACETAMINOPHEN AND CODEINE PHOSPHATE 300; 30 MG/1; MG/1
1 TABLET ORAL EVERY 6 HOURS PRN
Qty: 20 TABLET | Refills: 0 | Status: SHIPPED | OUTPATIENT
Start: 2023-11-06 | End: 2024-01-15 | Stop reason: ALTCHOICE

## 2023-11-06 NOTE — H&P
Chuck Dunn   [id number]   1980         Chief Complaint/    Left inguinal hernia              HPI/    42-year-old male presents with repair of left inguinal hernia    Past Medical History:   Diagnosis Date    Anxiety     Arthritis     Depression     GERD (gastroesophageal reflux disease)     Hypertension       Morbid obesity  Hypertension  Elevated lipids  GERD    Status post gastric sleeve  Smoker    Denies diabetes or coronary artery disease  Social History     Socioeconomic History    Marital status:      Spouse name: Not on file    Number of children: Not on file    Years of education: Not on file    Highest education level: Not on file   Occupational History    Not on file   Tobacco Use    Smoking status: Every Day     Packs/day: .25     Types: Cigarettes    Smokeless tobacco: Never   Vaping Use    Vaping Use: Never used   Substance and Sexual Activity    Alcohol use: Yes     Alcohol/week: 10.0 standard drinks of alcohol     Types: 10 Glasses of wine per week     Comment: 2 BOTTLES OF WINE/WEEK    Drug use: Never    Sexual activity: Yes   Other Topics Concern    Not on file   Social History Narrative    Not on file     Social Determinants of Health     Financial Resource Strain: Not on file   Food Insecurity: Not on file   Transportation Needs: Not on file   Physical Activity: Not on file   Stress: Not on file   Social Connections: Not on file   Intimate Partner Violence: Not on file   Housing Stability: Not on file      Smoker            Family History   Problem Relation Name Age of Onset    Other (Diabetes mellitus) Mother      Heart disease Mother      Diabetes Mother          Review of Systems   All other systems reviewed and are negative.       No current facility-administered medications for this encounter.    Current Outpatient Medications:     calcium carbonate (Oscal) 500 mg calcium (1,250 mg) tablet, Take 2 tablets (2,500 mg) by mouth once daily., Disp: , Rfl:     cholecalciferol  (Vitamin D3) 5,000 Units tablet, Take 1 tablet (5,000 Units) by mouth once daily., Disp: , Rfl:     losartan-hydrochlorothiazide (Hyzaar) 100-12.5 mg tablet, Take 1 tablet by mouth once daily., Disp: 90 tablet, Rfl: 1    multivitamin tablet, Take 1 tablet by mouth once daily., Disp: , Rfl:     omeprazole OTC (PriLOSEC OTC) 20 mg EC tablet, Take 1 tablet (20 mg) by mouth 2 times a day before meals. Do not crush, chew, or split., Disp: , Rfl:      Allergies   Allergen Reactions    Bee Venom Protein (Honey Bee) Swelling    Venom-Wasp Swelling        US scrotum  Narrative: Interpreted By:  DAVID LANDIN MD  MRN: [id number]  Patient Name: SUZANNE OLVERA     STUDY:  US SCROTUM;  8/18/2023 10:40 am     INDICATION:  enlarged testicle.     COMPARISON:  None.     ACCESSION NUMBER(S):  [id number]     ORDERING CLINICIAN:  NOEMI WETZEL     TECHNIQUE:  Multiple ultrasonographic images of scrotum and tested were obtained.     FINDINGS:  RIGHT HEMISCROTUM:     RIGHT TESTICLE:  The right testicle is 45 x 21 x 25 mm. It is homogeneous and with  preserved Doppler flow. No definite masses.     RIGHT EPIDIDYMIS:  The right epididymis is 13 x 5 x 10 mm and grossly unremarkable.     LEFT HEMISCROTUM:     LEFT TESTICLE:  The left testicle is homogeneous and with preserved Doppler flow. It  is approximately 55 x 22 x 34 mm.     LEFT EPIDIDYMIS:  The left epididymis is 13 x 13 x 9 mm. It appears grossly  unremarkable.     Question fat containing left inguinal hernia.     Impression: Negative bilateral testicles. Question fat containing left inguinal  hernia. Follow-up CT to further evaluate as clinically warranted.     MACRO:  None       [unfilled]     There were no vitals taken for this visit.       Physical Exam  Constitutional:       Appearance: Normal appearance.   HENT:      Head: Normocephalic and atraumatic.   Cardiovascular:      Rate and Rhythm: Normal rate.   Pulmonary:      Effort: Pulmonary effort is normal.      Breath  sounds: Normal breath sounds.   Abdominal:      Comments: Obese    No umbilical hernia    Note right inguinal hernia    Positive left inguinal hernia noted   Musculoskeletal:      Cervical back: Normal range of motion and neck supple.   Skin:     General: Skin is warm and dry.   Neurological:      General: No focal deficit present.      Mental Status: He is alert and oriented to person, place, and time.                  Assessment/    Left inguinal hernia    Obesity  Tobacco abuse    Plan/    Repair under general anesthesia    I plan an anterior approach.    Open repair of the inguinal hernia is reviewed.    The potential of bleeding, infection, MI, PE/DVT, death, etc. reviewed.  The anticipated convalescence is reviewed.  Use of mesh and prosthetic materials is reviewed.  The plan for postoperative pain management is reviewed.  All questions were answered and consent was obtained

## 2023-11-08 ENCOUNTER — ANESTHESIA (OUTPATIENT)
Dept: OPERATING ROOM | Facility: HOSPITAL | Age: 43
End: 2023-11-08
Payer: COMMERCIAL

## 2023-11-08 ENCOUNTER — TELEPHONE (OUTPATIENT)
Dept: SURGERY | Facility: HOSPITAL | Age: 43
End: 2023-11-08

## 2023-11-08 ENCOUNTER — HOSPITAL ENCOUNTER (OUTPATIENT)
Facility: HOSPITAL | Age: 43
Setting detail: OUTPATIENT SURGERY
Discharge: HOME | End: 2023-11-08
Attending: SURGERY | Admitting: SURGERY
Payer: COMMERCIAL

## 2023-11-08 ENCOUNTER — ANESTHESIA EVENT (OUTPATIENT)
Dept: OPERATING ROOM | Facility: HOSPITAL | Age: 43
End: 2023-11-08
Payer: COMMERCIAL

## 2023-11-08 VITALS
OXYGEN SATURATION: 96 % | HEIGHT: 71 IN | SYSTOLIC BLOOD PRESSURE: 152 MMHG | RESPIRATION RATE: 16 BRPM | DIASTOLIC BLOOD PRESSURE: 87 MMHG | BODY MASS INDEX: 44.1 KG/M2 | TEMPERATURE: 97.9 F | HEART RATE: 94 BPM | WEIGHT: 315 LBS

## 2023-11-08 DIAGNOSIS — K40.90 UNILATERAL INGUINAL HERNIA, WITHOUT OBSTRUCTION OR GANGRENE, NOT SPECIFIED AS RECURRENT: ICD-10-CM

## 2023-11-08 DIAGNOSIS — K40.90 UNILATERAL INGUINAL HERNIA WITHOUT OBSTRUCTION OR GANGRENE, RECURRENCE NOT SPECIFIED: Primary | ICD-10-CM

## 2023-11-08 PROCEDURE — 7100000001 HC RECOVERY ROOM TIME - INITIAL BASE CHARGE: Performed by: SURGERY

## 2023-11-08 PROCEDURE — 7100000002 HC RECOVERY ROOM TIME - EACH INCREMENTAL 1 MINUTE: Performed by: SURGERY

## 2023-11-08 PROCEDURE — 3600000003 HC OR TIME - INITIAL BASE CHARGE - PROCEDURE LEVEL THREE: Performed by: SURGERY

## 2023-11-08 PROCEDURE — 2720000007 HC OR 272 NO HCPCS: Performed by: SURGERY

## 2023-11-08 PROCEDURE — A49505 PR REPAIR ING HERNIA,5+Y/O,REDUCIBL: Performed by: ANESTHESIOLOGY

## 2023-11-08 PROCEDURE — 2500000004 HC RX 250 GENERAL PHARMACY W/ HCPCS (ALT 636 FOR OP/ED): Performed by: SURGERY

## 2023-11-08 PROCEDURE — 2500000004 HC RX 250 GENERAL PHARMACY W/ HCPCS (ALT 636 FOR OP/ED): Performed by: ANESTHESIOLOGIST ASSISTANT

## 2023-11-08 PROCEDURE — 49505 PRP I/HERN INIT REDUC >5 YR: CPT | Performed by: SURGERY

## 2023-11-08 PROCEDURE — C1781 MESH (IMPLANTABLE): HCPCS | Performed by: SURGERY

## 2023-11-08 PROCEDURE — 3700000002 HC GENERAL ANESTHESIA TIME - EACH INCREMENTAL 1 MINUTE: Performed by: SURGERY

## 2023-11-08 PROCEDURE — 88300 SURGICAL PATH GROSS: CPT | Performed by: STUDENT IN AN ORGANIZED HEALTH CARE EDUCATION/TRAINING PROGRAM

## 2023-11-08 PROCEDURE — 2500000001 HC RX 250 WO HCPCS SELF ADMINISTERED DRUGS (ALT 637 FOR MEDICARE OP): Performed by: ANESTHESIOLOGY

## 2023-11-08 PROCEDURE — 2500000005 HC RX 250 GENERAL PHARMACY W/O HCPCS: Performed by: ANESTHESIOLOGIST ASSISTANT

## 2023-11-08 PROCEDURE — 2500000004 HC RX 250 GENERAL PHARMACY W/ HCPCS (ALT 636 FOR OP/ED): Performed by: ANESTHESIOLOGY

## 2023-11-08 PROCEDURE — 2780000003 HC OR 278 NO HCPCS: Performed by: SURGERY

## 2023-11-08 PROCEDURE — 7100000010 HC PHASE TWO TIME - EACH INCREMENTAL 1 MINUTE: Performed by: SURGERY

## 2023-11-08 PROCEDURE — 3700000001 HC GENERAL ANESTHESIA TIME - INITIAL BASE CHARGE: Performed by: SURGERY

## 2023-11-08 PROCEDURE — 88300 SURGICAL PATH GROSS: CPT | Mod: TC,SUR,STJLAB | Performed by: SURGERY

## 2023-11-08 PROCEDURE — 2500000005 HC RX 250 GENERAL PHARMACY W/O HCPCS: Performed by: ANESTHESIOLOGY

## 2023-11-08 PROCEDURE — 96372 THER/PROPH/DIAG INJ SC/IM: CPT | Performed by: SURGERY

## 2023-11-08 PROCEDURE — 7100000009 HC PHASE TWO TIME - INITIAL BASE CHARGE: Performed by: SURGERY

## 2023-11-08 PROCEDURE — A49505 PR REPAIR ING HERNIA,5+Y/O,REDUCIBL: Performed by: ANESTHESIOLOGIST ASSISTANT

## 2023-11-08 PROCEDURE — 3600000008 HC OR TIME - EACH INCREMENTAL 1 MINUTE - PROCEDURE LEVEL THREE: Performed by: SURGERY

## 2023-11-08 DEVICE — BARD MESH
Type: IMPLANTABLE DEVICE | Site: GROIN | Status: FUNCTIONAL
Brand: BARD MESH

## 2023-11-08 RX ORDER — HEPARIN SODIUM 5000 [USP'U]/ML
5000 INJECTION, SOLUTION INTRAVENOUS; SUBCUTANEOUS
Status: COMPLETED | OUTPATIENT
Start: 2023-11-08 | End: 2023-11-08

## 2023-11-08 RX ORDER — ONDANSETRON HYDROCHLORIDE 2 MG/ML
INJECTION, SOLUTION INTRAVENOUS AS NEEDED
Status: DISCONTINUED | OUTPATIENT
Start: 2023-11-08 | End: 2023-11-08

## 2023-11-08 RX ORDER — METOCLOPRAMIDE HYDROCHLORIDE 5 MG/ML
10 INJECTION INTRAMUSCULAR; INTRAVENOUS ONCE AS NEEDED
Status: DISCONTINUED | OUTPATIENT
Start: 2023-11-08 | End: 2023-11-08 | Stop reason: HOSPADM

## 2023-11-08 RX ORDER — LABETALOL HYDROCHLORIDE 5 MG/ML
5 INJECTION, SOLUTION INTRAVENOUS
Status: DISCONTINUED | OUTPATIENT
Start: 2023-11-08 | End: 2023-11-08 | Stop reason: HOSPADM

## 2023-11-08 RX ORDER — HYDROMORPHONE HYDROCHLORIDE 1 MG/ML
1 INJECTION, SOLUTION INTRAMUSCULAR; INTRAVENOUS; SUBCUTANEOUS EVERY 5 MIN PRN
Status: DISCONTINUED | OUTPATIENT
Start: 2023-11-08 | End: 2023-11-08 | Stop reason: HOSPADM

## 2023-11-08 RX ORDER — SODIUM CHLORIDE, SODIUM LACTATE, POTASSIUM CHLORIDE, CALCIUM CHLORIDE 600; 310; 30; 20 MG/100ML; MG/100ML; MG/100ML; MG/100ML
100 INJECTION, SOLUTION INTRAVENOUS CONTINUOUS
Status: DISCONTINUED | OUTPATIENT
Start: 2023-11-08 | End: 2023-11-08 | Stop reason: HOSPADM

## 2023-11-08 RX ORDER — MIDAZOLAM HYDROCHLORIDE 1 MG/ML
INJECTION, SOLUTION INTRAMUSCULAR; INTRAVENOUS AS NEEDED
Status: DISCONTINUED | OUTPATIENT
Start: 2023-11-08 | End: 2023-11-08

## 2023-11-08 RX ORDER — LIDOCAINE HYDROCHLORIDE 20 MG/ML
INJECTION, SOLUTION INFILTRATION; PERINEURAL AS NEEDED
Status: DISCONTINUED | OUTPATIENT
Start: 2023-11-08 | End: 2023-11-08

## 2023-11-08 RX ORDER — FENTANYL CITRATE 50 UG/ML
INJECTION, SOLUTION INTRAMUSCULAR; INTRAVENOUS AS NEEDED
Status: DISCONTINUED | OUTPATIENT
Start: 2023-11-08 | End: 2023-11-08

## 2023-11-08 RX ORDER — DIPHENHYDRAMINE HYDROCHLORIDE 50 MG/ML
12.5 INJECTION INTRAMUSCULAR; INTRAVENOUS ONCE AS NEEDED
Status: DISCONTINUED | OUTPATIENT
Start: 2023-11-08 | End: 2023-11-08 | Stop reason: HOSPADM

## 2023-11-08 RX ORDER — HYDROCODONE BITARTRATE AND ACETAMINOPHEN 5; 325 MG/1; MG/1
1 TABLET ORAL EVERY 4 HOURS PRN
Status: DISCONTINUED | OUTPATIENT
Start: 2023-11-08 | End: 2023-11-08 | Stop reason: HOSPADM

## 2023-11-08 RX ORDER — PROPOFOL 10 MG/ML
INJECTION, EMULSION INTRAVENOUS AS NEEDED
Status: DISCONTINUED | OUTPATIENT
Start: 2023-11-08 | End: 2023-11-08

## 2023-11-08 RX ORDER — HYDROMORPHONE HYDROCHLORIDE 1 MG/ML
INJECTION, SOLUTION INTRAMUSCULAR; INTRAVENOUS; SUBCUTANEOUS AS NEEDED
Status: DISCONTINUED | OUTPATIENT
Start: 2023-11-08 | End: 2023-11-08

## 2023-11-08 RX ORDER — ALBUTEROL SULFATE 0.83 MG/ML
2.5 SOLUTION RESPIRATORY (INHALATION)
Status: DISCONTINUED | OUTPATIENT
Start: 2023-11-08 | End: 2023-11-08 | Stop reason: HOSPADM

## 2023-11-08 RX ORDER — HYDROMORPHONE HYDROCHLORIDE 1 MG/ML
0.5 INJECTION, SOLUTION INTRAMUSCULAR; INTRAVENOUS; SUBCUTANEOUS EVERY 5 MIN PRN
Status: DISCONTINUED | OUTPATIENT
Start: 2023-11-08 | End: 2023-11-08 | Stop reason: HOSPADM

## 2023-11-08 RX ORDER — ROCURONIUM BROMIDE 10 MG/ML
INJECTION, SOLUTION INTRAVENOUS AS NEEDED
Status: DISCONTINUED | OUTPATIENT
Start: 2023-11-08 | End: 2023-11-08

## 2023-11-08 RX ORDER — DEXAMETHASONE SODIUM PHOSPHATE 4 MG/ML
INJECTION, SOLUTION INTRA-ARTICULAR; INTRALESIONAL; INTRAMUSCULAR; INTRAVENOUS; SOFT TISSUE AS NEEDED
Status: DISCONTINUED | OUTPATIENT
Start: 2023-11-08 | End: 2023-11-08

## 2023-11-08 RX ORDER — MIDAZOLAM HYDROCHLORIDE 1 MG/ML
1 INJECTION, SOLUTION INTRAMUSCULAR; INTRAVENOUS ONCE AS NEEDED
Status: DISCONTINUED | OUTPATIENT
Start: 2023-11-08 | End: 2023-11-08 | Stop reason: HOSPADM

## 2023-11-08 RX ORDER — HYDRALAZINE HYDROCHLORIDE 20 MG/ML
5 INJECTION INTRAMUSCULAR; INTRAVENOUS EVERY 30 MIN PRN
Status: DISCONTINUED | OUTPATIENT
Start: 2023-11-08 | End: 2023-11-08 | Stop reason: HOSPADM

## 2023-11-08 RX ORDER — LABETALOL HYDROCHLORIDE 5 MG/ML
INJECTION, SOLUTION INTRAVENOUS AS NEEDED
Status: DISCONTINUED | OUTPATIENT
Start: 2023-11-08 | End: 2023-11-08

## 2023-11-08 RX ADMIN — LABETALOL HYDROCHLORIDE 5 MG: 5 INJECTION, SOLUTION INTRAVENOUS at 09:36

## 2023-11-08 RX ADMIN — DEXAMETHASONE SODIUM PHOSPHATE 4 MG: 4 INJECTION, SOLUTION INTRAMUSCULAR; INTRAVENOUS at 09:07

## 2023-11-08 RX ADMIN — HYDROMORPHONE HYDROCHLORIDE 0.5 MG: 1 INJECTION, SOLUTION INTRAMUSCULAR; INTRAVENOUS; SUBCUTANEOUS at 10:13

## 2023-11-08 RX ADMIN — LIDOCAINE HYDROCHLORIDE 100 MG: 20 INJECTION, SOLUTION INFILTRATION; PERINEURAL at 08:57

## 2023-11-08 RX ADMIN — ONDANSETRON 4 MG: 2 INJECTION INTRAMUSCULAR; INTRAVENOUS at 10:02

## 2023-11-08 RX ADMIN — Medication 3 G: at 09:01

## 2023-11-08 RX ADMIN — HEPARIN SODIUM 5000 UNITS: 5000 INJECTION INTRAVENOUS; SUBCUTANEOUS at 08:03

## 2023-11-08 RX ADMIN — MIDAZOLAM 2 MG: 1 INJECTION INTRAMUSCULAR; INTRAVENOUS at 08:52

## 2023-11-08 RX ADMIN — HYDROMORPHONE HYDROCHLORIDE 0.5 MG: 1 INJECTION, SOLUTION INTRAMUSCULAR; INTRAVENOUS; SUBCUTANEOUS at 09:14

## 2023-11-08 RX ADMIN — FENTANYL CITRATE 100 MCG: 50 INJECTION, SOLUTION INTRAMUSCULAR; INTRAVENOUS at 08:57

## 2023-11-08 RX ADMIN — HYDROMORPHONE HYDROCHLORIDE 0.5 MG: 1 INJECTION, SOLUTION INTRAMUSCULAR; INTRAVENOUS; SUBCUTANEOUS at 10:44

## 2023-11-08 RX ADMIN — SODIUM CHLORIDE, SODIUM LACTATE, POTASSIUM CHLORIDE, AND CALCIUM CHLORIDE: 600; 310; 30; 20 INJECTION, SOLUTION INTRAVENOUS at 08:52

## 2023-11-08 RX ADMIN — SUGAMMADEX 400 MG: 100 INJECTION, SOLUTION INTRAVENOUS at 10:04

## 2023-11-08 RX ADMIN — PROPOFOL 200 MG: 10 INJECTION, EMULSION INTRAVENOUS at 08:57

## 2023-11-08 RX ADMIN — HYDROMORPHONE HYDROCHLORIDE 0.5 MG: 1 INJECTION, SOLUTION INTRAMUSCULAR; INTRAVENOUS; SUBCUTANEOUS at 10:37

## 2023-11-08 RX ADMIN — ROCURONIUM BROMIDE 50 MG: 10 INJECTION, SOLUTION INTRAVENOUS at 08:57

## 2023-11-08 RX ADMIN — ROCURONIUM BROMIDE 10 MG: 10 INJECTION, SOLUTION INTRAVENOUS at 09:58

## 2023-11-08 RX ADMIN — ROCURONIUM BROMIDE 10 MG: 10 INJECTION, SOLUTION INTRAVENOUS at 09:24

## 2023-11-08 RX ADMIN — HYDROCODONE BITARTRATE AND ACETAMINOPHEN 1 TABLET: 5; 325 TABLET ORAL at 12:20

## 2023-11-08 RX ADMIN — Medication 6 L/MIN: at 10:15

## 2023-11-08 SDOH — HEALTH STABILITY: MENTAL HEALTH: CURRENT SMOKER: 1

## 2023-11-08 ASSESSMENT — PAIN SCALES - WONG BAKER: WONGBAKER_NUMERICALRESPONSE: HURTS LITTLE MORE

## 2023-11-08 ASSESSMENT — PAIN SCALES - PAIN ASSESSMENT IN ADVANCED DEMENTIA (PAINAD)
BREATHING: NORMAL
CONSOLABILITY: NO NEED TO CONSOLE
FACIALEXPRESSION: FACIAL GRIMACING
TOTALSCORE: 2
BODYLANGUAGE: RELAXED

## 2023-11-08 ASSESSMENT — PAIN - FUNCTIONAL ASSESSMENT
PAIN_FUNCTIONAL_ASSESSMENT: 0-10

## 2023-11-08 ASSESSMENT — PAIN SCALES - GENERAL
PAINLEVEL_OUTOF10: 5 - MODERATE PAIN
PAINLEVEL_OUTOF10: 5 - MODERATE PAIN
PAINLEVEL_OUTOF10: 3
PAINLEVEL_OUTOF10: 3
PAINLEVEL_OUTOF10: 0 - NO PAIN
PAINLEVEL_OUTOF10: 2
PAINLEVEL_OUTOF10: 5 - MODERATE PAIN
PAINLEVEL_OUTOF10: 4

## 2023-11-08 ASSESSMENT — PAIN DESCRIPTION - LOCATION: LOCATION: ABDOMEN

## 2023-11-08 ASSESSMENT — PAIN DESCRIPTION - DESCRIPTORS: DESCRIPTORS: ACHING

## 2023-11-08 NOTE — ANESTHESIA POSTPROCEDURE EVALUATION
Patient: Chuck Dunn    Procedure Summary       Date: 11/08/23 Room / Location: [facility] OR 07 / [facility]    Anesthesia Start: 0852 Anesthesia Stop: 1018    Procedure: REPAIR OF LEFT INGUINAL HERNIA WITH MESH (Left) Diagnosis:       Unilateral inguinal hernia, without obstruction or gangrene, not specified as recurrent      (Unilateral inguinal hernia, without obstruction or gangrene, not specified as recurrent [K40.90])    Surgeons: Curtis Salas MD Responsible Provider: Alonso Blue MD    Anesthesia Type: general ASA Status: 2            Anesthesia Type: general    Vitals Value Taken Time   /94 11/08/23 1018   Temp 36.1 11/08/23 1018   Pulse 84 11/08/23 1018   Resp 20 11/08/23 1018   SpO2 99 11/08/23 1018       Anesthesia Post Evaluation    Patient location during evaluation: PACU  Level of consciousness: awake  Pain management: adequate  Airway patency: patent  Cardiovascular status: acceptable  Respiratory status: acceptable  Hydration status: acceptable  Comments: No postoperative nausea/vomiting.         There were no known notable events for this encounter.

## 2023-11-08 NOTE — OP NOTE
REPAIR OF LEFT INGUINAL HERNIA WITH MESH (L) Operative Note     Date: 2023  OR Location: [facility] OR    Name: Chuck Dunn, : 1980, Age: 42 y.o., MRN: [id number], Sex: male    Diagnosis  Pre-op Diagnosis     * Unilateral inguinal hernia, without obstruction or gangrene, not specified as recurrent [K40.90] Post-op Diagnosis     * Unilateral inguinal hernia, without obstruction or gangrene, not specified as recurrent [K40.90]     Procedures  REPAIR OF LEFT INGUINAL HERNIA WITH MESH  52047 - OR RPR INGUN HERNIA SLIDING ANY AGE      Surgeons      * Curtis Salas - Primary    Resident/Fellow/Other Assistant:  Surgeon(s) and Role:    Procedure Summary  Anesthesia: General  ASA: II  Anesthesia Staff: Anesthesiologist: Alonso Blue MD  C-AA: DELMI Araiza  Estimated Blood Loss: Minimal mL  Intra-op Medications:   Medication Name Total Dose   ceFAZolin (Ancef) in dextrose 5% 100 mL IV 3 g 3 g              Anesthesia Record               Intraprocedure I/O Totals          Intake    .00 mL    Propofol Drip 0.00 mL    The total shown is the total volume documented since Anesthesia Start was filed.    Total Intake 900 mL          Specimen:   ID Type Source Tests Collected by Time   1 : Hernia Tissue Tissue HERNIA SAC SURGICAL PATHOLOGY EXAM Curtis Salas MD 2023 1001        Staff:   Circulator: Farhana Martinez RN  Relief Circulator: Kevin Gaitan Jr., RN  Scrub Person: Nelly Archer         Drains and/or Catheters: * None in log *    Tourniquet Times:         Implants:  Implants       Type Name Action Serial No.      Surgical Mesh Sling Implant PATCH, MESH, MARLEX, 3 X 6 IN, POLYPROPYLENE - [id number] Implanted               Findings: Large lipoma    Indications: Chuck Dunn is an 42 y.o. male who is having surgery for Unilateral inguinal hernia, without obstruction or gangrene, not specified as recurrent [K40.90]    The patient was seen in the preoperative area. The risks,  benefits, complications, treatment options, non-operative alternatives, expected recovery and outcomes were discussed with the patient. The possibilities of reaction to medication, pulmonary aspiration, injury to surrounding structures, bleeding, recurrent infection, the need for additional procedures, failure to diagnose a condition, and creating a complication requiring transfusion or operation were discussed with the patient. The patient concurred with the proposed plan, giving informed consent.  The site of surgery was properly noted/marked if necessary per policy. The patient has been actively warmed in preoperative area. Preoperative antibiotics have been ordered and given within 1 hours of incision. Venous thrombosis prophylaxis have been ordered including bilateral sequential compression devices and chemical prophylaxis    Procedure Details: Patient presents for elective repair of left inguinal hernias.  The risk benefits indications for this reviewed with him.  The potential bleeding infection MI PE death etc. reviewed.  The anticipated convalescence is reviewed.  Use of mesh and prosthetic materials is reviewed.  All questions were answered and consent was obtained.    Patient was taken to the operating room placed on table in supine position.  General esthesia was obtained.  Abdomen was prepped and draped in a sterile fashion.  Antibiotics were given.  DVT prophylaxis obtained using subcutaneous heparin placement of external Mattock compression stockings.  Timeout procedures were followed.    After prepping left inguinal hernia incision was made.  Dissection was carried down to the skin subcutaneous tissue and Jayesh's fascia level the external Bleich.  Note is made that exposure was a made difficult by the morbid obesity    The external Bleich was opened.  Cord was mobilized and controlled.  Dissection was carried out there was a large lipoma that accounted for the majority of the preoperative bulge.   This was  from the cord structures and freed up to the level of the internal ring and transected    There was no indirect sac    There is mild diffuse weakness of the direct and indirect fraction of the floor but no direct specific holes were seen    A 3 x 6 inch piece of flat polypropylene mesh was brought onto the field.  Secured to the pubic tubercle, origins of Ashok's ligament and the conjoined tendon using several interrupted 0 Prolene sutures.  A double-armed 2-0 Prolene was used to further anchor the mesh to the tubercle.  1 arm of the 2-0 Prolene was used to anchor the mesh in a running fashion to the iliopubic tract and shelving edge of the inguinal ligament.  Second arm of the 2-0 Prolene was used to anchor the mesh in a running fashion to the lateral edge conjoined tendon superior margin of transversalis arch.  Laterally a transverse that was created mesh so as to allow passage of the cord.  Defect in the mesh was tightened with several sutures    Repair inspected hemostasis was good.  Counts reported as correct    The external Bleich was closed with 2-0 Vicryl.  Jayesh's was closed with 3-0 Vicryl.  Skin was closed with staples.    Patient tolerated procedure well  Complications:  None; patient tolerated the procedure well.    Disposition: PACU - hemodynamically stable.  Condition: stable             Attending Attestation: I performed the procedure.    Curtis Salas  Phone Number: [phone]

## 2023-11-08 NOTE — ANESTHESIA PREPROCEDURE EVALUATION
Patient: Chuck Dunn    Procedure Information       Date/Time: 11/08/23 0845    Procedure: REPAIR OF LEFT INGUINAL HERNIA (Left)    Location: [facility] OR [facility] / [facility]    Surgeons: Curtis Salas MD            Relevant Problems   Cardiovascular   (+) HTN (hypertension)      GI   (+) GERD (gastroesophageal reflux disease)      Neuro/Psych   (+) Current mild episode of major depressive disorder (CMS/HCC)       Clinical information reviewed:   Tobacco  Allergies  Meds   Med Hx  Surg Hx   Fam Hx  Soc Hx    There were no vitals filed for this visit.    Past Surgical History:   Procedure Laterality Date    BARIATRIC SURGERY      OTHER SURGICAL HISTORY  03/23/2021    Sleeve gastrectomy     Past Medical History:   Diagnosis Date    Anxiety     Arthritis     Depression     GERD (gastroesophageal reflux disease)     Hypertension        Current Facility-Administered Medications:     ceFAZolin (Ancef) in dextrose 5% 100 mL IV 3 g, 3 g, intravenous, Once in OR, Curtis Salas MD  Prior to Admission medications    Medication Sig Start Date End Date Taking? Authorizing Provider   calcium carbonate (Oscal) 500 mg calcium (1,250 mg) tablet Take 2 tablets (2,500 mg) by mouth once daily.   Yes Historical Provider, MD   cholecalciferol (Vitamin D3) 5,000 Units tablet Take 1 tablet (5,000 Units) by mouth once daily.   Yes Historical Provider, MD   losartan-hydrochlorothiazide (Hyzaar) 100-12.5 mg tablet Take 1 tablet by mouth once daily. 7/14/23 1/10/24 Yes Thania Elmore MD   multivitamin tablet Take 1 tablet by mouth once daily.   Yes Historical Provider, MD   omeprazole OTC (PriLOSEC OTC) 20 mg EC tablet Take 1 tablet (20 mg) by mouth 2 times a day before meals. Do not crush, chew, or split.   Yes Historical Provider, MD   acetaminophen-codeine (Tylenol w/ Codeine #3) 300-30 mg tablet Take 1 tablet by mouth every 6 hours if needed for severe pain (7 - 10). 11/6/23   Curtis Salas MD     Allergies   Allergen  "Reactions    Bee Venom Protein (Honey Bee) Swelling    Venom-Wasp Swelling     Social History     Tobacco Use    Smoking status: Every Day     Packs/day: 0.25     Years: 15.00     Additional pack years: 0.00     Total pack years: 3.75     Types: Cigarettes    Smokeless tobacco: Never   Substance Use Topics    Alcohol use: Yes     Alcohol/week: 10.0 standard drinks of alcohol     Types: 10 Glasses of wine per week     Comment: 2 BOTTLES OF WINE/WEEK         Chemistry    Lab Results   Component Value Date/Time     10/23/2023 1001    K 4.2 10/23/2023 1001     10/23/2023 1001    CO2 31 10/23/2023 1001    BUN 8 10/23/2023 1001    CREATININE 0.74 10/23/2023 1001    Lab Results   Component Value Date/Time    CALCIUM 9.3 10/23/2023 1001    ALKPHOS 44 04/27/2023 1004    AST 25 04/27/2023 1004    ALT 42 04/27/2023 1004    BILITOT 0.6 04/27/2023 1004          Lab Results   Component Value Date/Time    WBC 7.1 04/27/2023 1004    HGB 15.6 04/27/2023 1004    HCT 48.4 04/27/2023 1004     04/27/2023 1004     No results found for: \"PROTIME\", \"PTT\", \"INR\"  No results found for this or any previous visit (from the past 4464 hour(s)).  No results found for this or any previous visit from the past 1095 days.     NPO Detail:  NPO/Void Status  Carbonhydrate Drink Given Prior to Surgery? : N  Date of Last Liquid: 11/07/23  Time of Last Liquid: 2200  Date of Last Solid: 11/07/23  Time of Last Solid: 2200  Last Intake Type: Food  Time of Last Void: 0700         Physical Exam    Airway  Mallampati: II  TM distance: >3 FB  Neck ROM: full     Cardiovascular - normal exam     Dental - normal exam     Pulmonary - normal exam     Abdominal - normal exam             Anesthesia Plan    ASA 2     general     The patient is a current smoker.    intravenous induction   Anesthetic plan and risks discussed with patient.    Plan discussed with CAA.      "

## 2023-11-08 NOTE — BRIEF OP NOTE
Date: 2023  OR Location: [facility]    Name: Chuck Dunn, : 1980, Age: 42 y.o., MRN: [id number], Sex: male    Diagnosis  Pre-op Diagnosis     * Unilateral inguinal hernia, without obstruction or gangrene, not specified as recurrent [K40.90] Post-op Diagnosis     * Unilateral inguinal hernia, without obstruction or gangrene, not specified as recurrent [K40.90]     Procedures  REPAIR OF LEFT INGUINAL HERNIA WITH MESH  50954 - TX RPR INGUN HERNIA SLIDING ANY AGE      Surgeons      * Curtis Salas - Primary    Resident/Fellow/Other Assistant:  Surgeon(s) and Role:    Procedure Summary  Anesthesia: General  ASA: II  Anesthesia Staff: Anesthesiologist: Alonso Blue MD  C-AA: DELMI Araiza  Estimated Blood Loss: Minimal mL  Intra-op Medications:   Medication Name Total Dose   ceFAZolin (Ancef) in dextrose 5% 100 mL IV 3 g 3 g              Anesthesia Record               Intraprocedure I/O Totals          Intake    .00 mL    Propofol Drip 0.00 mL    The total shown is the total volume documented since Anesthesia Start was filed.    Total Intake 900 mL          Specimen:   ID Type Source Tests Collected by Time   1 : Hernia Tissue Tissue HERNIA SAC SURGICAL PATHOLOGY EXAM Curtis Salas MD 2023 1001        Staff:   Circulator: Farhana Martinez RN  Relief Circulator: Kevin Gaitan Jr., RN  Scrub Person: Nelly Archer          Findings: Large lipoma    Complications:  None; patient tolerated the procedure well.     Disposition: PACU - hemodynamically stable.  Condition: stable  Specimens Collected:   ID Type Source Tests Collected by Time   1 : Hernia Tissue Tissue HERNIA SAC SURGICAL PATHOLOGY EXAM Curtis Salas MD 2023 1001     Attending Attestation:     Curtis Salas  Phone Number: [phone]

## 2023-11-08 NOTE — ANESTHESIA PROCEDURE NOTES
Airway  Date/Time: 11/8/2023 9:00 AM  Urgency: elective    Airway not difficult    Staffing  Performed: LEILA   Authorized by: Alonso Blue MD    Performed by: DELMI Araiza  Patient location during procedure: OR    Indications and Patient Condition  Indications for airway management: anesthesia  Spontaneous ventilation: present  Sedation level: deep  Preoxygenated: yes  Patient position: sniffing  Mask difficulty assessment: 1 - vent by mask    Final Airway Details  Final airway type: endotracheal airway      Successful airway: ETT  Cuffed: yes   Successful intubation technique: direct laryngoscopy  Endotracheal tube insertion site: oral  Blade: Eladia  Blade size: #4  ETT size (mm): 7.5  Cormack-Lehane Classification: grade I - full view of glottis  Placement verified by: chest auscultation and capnometry   Measured from: gums  ETT to gums (cm): 22  Number of attempts at approach: 1  Number of other approaches attempted: 0

## 2023-11-09 NOTE — TELEPHONE ENCOUNTER
Patient called answering service to report T of 100.2'F and HR of 120.  S/P left inguinal hernia repair with mesh today.  Denies chest pain, light-headedness, dizziness, syncope, dyspnea.  Denies abdominal pain.  Denies significant edema or ecchymosis of operative area and scrotum.  Surgical dressing dry and intact.  Has been drinking some fluids.  Has not utilized prescribed acetaminophen-codeine.    Instructed patient to stay well hydrated, use tylenol or the prescribed acetaminophen-codeine, and continue to follow his T.  Instructed patient to present to ED should he develop any of the concerning symptoms queried above.  Patient acknowledged understanding.

## 2023-11-21 ENCOUNTER — OFFICE VISIT (OUTPATIENT)
Dept: SURGERY | Facility: CLINIC | Age: 43
End: 2023-11-21
Payer: COMMERCIAL

## 2023-11-21 VITALS
OXYGEN SATURATION: 96 % | HEART RATE: 103 BPM | DIASTOLIC BLOOD PRESSURE: 89 MMHG | TEMPERATURE: 97.7 F | SYSTOLIC BLOOD PRESSURE: 129 MMHG | RESPIRATION RATE: 16 BRPM

## 2023-11-21 DIAGNOSIS — K40.90 NON-RECURRENT UNILATERAL INGUINAL HERNIA WITHOUT OBSTRUCTION OR GANGRENE: Primary | ICD-10-CM

## 2023-11-21 PROCEDURE — 3074F SYST BP LT 130 MM HG: CPT | Performed by: SURGERY

## 2023-11-21 PROCEDURE — 3079F DIAST BP 80-89 MM HG: CPT | Performed by: SURGERY

## 2023-11-21 PROCEDURE — 99024 POSTOP FOLLOW-UP VISIT: CPT | Performed by: SURGERY

## 2023-11-22 NOTE — PROGRESS NOTES
Chief complaint/    Postop check    HPI/    Status post open hernia repair on 11/8    Patient doing quite well currently.  Ambulating easily.  Performing light activities any difficulty.    GI and  function at baseline        Physical exam/    Groin wound is clean and dry without redness or drainage.  There is no tenderness or swelling        Assessment/    Doing well after repair of left inguinal hernia        Plan/    Continue to increase level of activity    RTC 6 weeks for final check

## 2024-01-04 LAB
LABORATORY COMMENT REPORT: NORMAL
PATH REPORT.FINAL DX SPEC: NORMAL
PATH REPORT.GROSS SPEC: NORMAL
PATH REPORT.RELEVANT HX SPEC: NORMAL
PATH REPORT.TOTAL CANCER: NORMAL

## 2024-01-15 ENCOUNTER — OFFICE VISIT (OUTPATIENT)
Dept: PRIMARY CARE | Facility: CLINIC | Age: 44
End: 2024-01-15
Payer: COMMERCIAL

## 2024-01-15 VITALS
HEIGHT: 71 IN | TEMPERATURE: 97.1 F | HEART RATE: 88 BPM | BODY MASS INDEX: 44.1 KG/M2 | SYSTOLIC BLOOD PRESSURE: 130 MMHG | RESPIRATION RATE: 16 BRPM | DIASTOLIC BLOOD PRESSURE: 80 MMHG | WEIGHT: 315 LBS

## 2024-01-15 DIAGNOSIS — I10 PRIMARY HYPERTENSION: ICD-10-CM

## 2024-01-15 PROCEDURE — 3079F DIAST BP 80-89 MM HG: CPT | Performed by: FAMILY MEDICINE

## 2024-01-15 PROCEDURE — 99213 OFFICE O/P EST LOW 20 MIN: CPT | Performed by: FAMILY MEDICINE

## 2024-01-15 PROCEDURE — 3074F SYST BP LT 130 MM HG: CPT | Performed by: FAMILY MEDICINE

## 2024-01-15 RX ORDER — LOSARTAN POTASSIUM AND HYDROCHLOROTHIAZIDE 12.5; 1 MG/1; MG/1
1 TABLET ORAL DAILY
Qty: 90 TABLET | Refills: 1 | Status: SHIPPED | OUTPATIENT
Start: 2024-01-15 | End: 2024-07-13

## 2024-01-15 ASSESSMENT — PATIENT HEALTH QUESTIONNAIRE - PHQ9
1. LITTLE INTEREST OR PLEASURE IN DOING THINGS: NOT AT ALL
2. FEELING DOWN, DEPRESSED OR HOPELESS: NOT AT ALL
SUM OF ALL RESPONSES TO PHQ9 QUESTIONS 1 & 2: 0

## 2024-01-15 ASSESSMENT — ENCOUNTER SYMPTOMS: HYPERTENSION: 1

## 2024-01-15 NOTE — PROGRESS NOTES
"Subjective   Patient ID: Chuck Dunn is a 43 y.o. male who presents for Hypertension.    Hypertension  This is a chronic problem. The problem is controlled. The current treatment provides significant improvement.        Review of Systems    Objective   /80   Pulse 88   Temp 36.2 °C (97.1 °F) (Temporal)   Resp 16   Ht 1.803 m (5' 11\")   Wt 144 kg (318 lb)   BMI 44.35 kg/m²     Physical Exam  Vitals and nursing note reviewed.   Constitutional:       General: He is not in acute distress.     Appearance: He is not ill-appearing.   HENT:      Head: Normocephalic and atraumatic.      Mouth/Throat:      Mouth: Mucous membranes are moist.   Eyes:      Conjunctiva/sclera: Conjunctivae normal.   Cardiovascular:      Rate and Rhythm: Normal rate and regular rhythm.      Heart sounds: Normal heart sounds.   Pulmonary:      Effort: Pulmonary effort is normal.      Breath sounds: Normal breath sounds.   Skin:     General: Skin is warm.   Neurological:      Mental Status: He is alert.   Psychiatric:         Mood and Affect: Mood normal.         Thought Content: Thought content normal.         Judgment: Judgment normal.         Assessment/Plan   Problem List Items Addressed This Visit             ICD-10-CM    HTN (hypertension) I10    Relevant Medications    losartan-hydrochlorothiazide (Hyzaar) 100-12.5 mg tablet          "

## 2024-03-26 ENCOUNTER — APPOINTMENT (OUTPATIENT)
Dept: PRIMARY CARE | Facility: CLINIC | Age: 44
End: 2024-03-26
Payer: COMMERCIAL

## 2024-04-08 ENCOUNTER — APPOINTMENT (OUTPATIENT)
Dept: PRIMARY CARE | Facility: CLINIC | Age: 44
End: 2024-04-08
Payer: COMMERCIAL

## 2024-05-03 ENCOUNTER — OFFICE VISIT (OUTPATIENT)
Dept: PRIMARY CARE | Facility: CLINIC | Age: 44
End: 2024-05-03
Payer: COMMERCIAL

## 2024-05-03 VITALS
DIASTOLIC BLOOD PRESSURE: 82 MMHG | WEIGHT: 315 LBS | OXYGEN SATURATION: 98 % | SYSTOLIC BLOOD PRESSURE: 138 MMHG | HEIGHT: 71 IN | TEMPERATURE: 98 F | HEART RATE: 84 BPM | BODY MASS INDEX: 44.1 KG/M2 | RESPIRATION RATE: 16 BRPM

## 2024-05-03 DIAGNOSIS — G47.33 OSA (OBSTRUCTIVE SLEEP APNEA): Primary | ICD-10-CM

## 2024-05-03 DIAGNOSIS — F33.1 MODERATE EPISODE OF RECURRENT MAJOR DEPRESSIVE DISORDER (MULTI): ICD-10-CM

## 2024-05-03 DIAGNOSIS — Z00.00 WELL ADULT HEALTH CHECK: ICD-10-CM

## 2024-05-03 DIAGNOSIS — E66.01 CLASS 3 SEVERE OBESITY DUE TO EXCESS CALORIES WITHOUT SERIOUS COMORBIDITY WITH BODY MASS INDEX (BMI) OF 45.0 TO 49.9 IN ADULT (MULTI): ICD-10-CM

## 2024-05-03 DIAGNOSIS — Z23 IMMUNIZATION DUE: ICD-10-CM

## 2024-05-03 DIAGNOSIS — Z12.5 ENCOUNTER FOR PROSTATE CANCER SCREENING: ICD-10-CM

## 2024-05-03 PROBLEM — E66.813 CLASS 3 SEVERE OBESITY DUE TO EXCESS CALORIES WITHOUT SERIOUS COMORBIDITY WITH BODY MASS INDEX (BMI) OF 45.0 TO 49.9 IN ADULT: Status: ACTIVE | Noted: 2023-02-14

## 2024-05-03 PROCEDURE — 90471 IMMUNIZATION ADMIN: CPT | Performed by: INTERNAL MEDICINE

## 2024-05-03 PROCEDURE — 90739 HEPB VACC 2/4 DOSE ADULT IM: CPT | Performed by: INTERNAL MEDICINE

## 2024-05-03 PROCEDURE — 90632 HEPA VACCINE ADULT IM: CPT | Performed by: INTERNAL MEDICINE

## 2024-05-03 PROCEDURE — 99396 PREV VISIT EST AGE 40-64: CPT | Performed by: INTERNAL MEDICINE

## 2024-05-03 PROCEDURE — 90472 IMMUNIZATION ADMIN EACH ADD: CPT | Performed by: INTERNAL MEDICINE

## 2024-05-03 PROCEDURE — 3075F SYST BP GE 130 - 139MM HG: CPT | Performed by: INTERNAL MEDICINE

## 2024-05-03 PROCEDURE — 3008F BODY MASS INDEX DOCD: CPT | Performed by: INTERNAL MEDICINE

## 2024-05-03 PROCEDURE — 3079F DIAST BP 80-89 MM HG: CPT | Performed by: INTERNAL MEDICINE

## 2024-05-03 RX ORDER — VITAMIN E MIXED 400 UNIT
CAPSULE ORAL DAILY
COMMUNITY
End: 2024-05-17 | Stop reason: ENTERED-IN-ERROR

## 2024-05-03 RX ORDER — GLUCOSAM/CHONDRO/HERB 149/HYAL 750-100 MG
1 TABLET ORAL EVERY EVENING
COMMUNITY

## 2024-05-03 ASSESSMENT — ENCOUNTER SYMPTOMS
CHILLS: 0
ABDOMINAL PAIN: 0
SHORTNESS OF BREATH: 0
TROUBLE SWALLOWING: 0
FEVER: 0
PALPITATIONS: 0
SORE THROAT: 0

## 2024-05-03 ASSESSMENT — PATIENT HEALTH QUESTIONNAIRE - PHQ9
SUM OF ALL RESPONSES TO PHQ QUESTIONS 1-9: 14
3. TROUBLE FALLING OR STAYING ASLEEP OR SLEEPING TOO MUCH: NOT AT ALL
2. FEELING DOWN, DEPRESSED OR HOPELESS: MORE THAN HALF THE DAYS
9. THOUGHTS THAT YOU WOULD BE BETTER OFF DEAD, OR OF HURTING YOURSELF: NOT AT ALL
10. IF YOU CHECKED OFF ANY PROBLEMS, HOW DIFFICULT HAVE THESE PROBLEMS MADE IT FOR YOU TO DO YOUR WORK, TAKE CARE OF THINGS AT HOME, OR GET ALONG WITH OTHER PEOPLE: NOT DIFFICULT AT ALL
4. FEELING TIRED OR HAVING LITTLE ENERGY: NEARLY EVERY DAY
1. LITTLE INTEREST OR PLEASURE IN DOING THINGS: NEARLY EVERY DAY
5. POOR APPETITE OR OVEREATING: NEARLY EVERY DAY
7. TROUBLE CONCENTRATING ON THINGS, SUCH AS READING THE NEWSPAPER OR WATCHING TELEVISION: NOT AT ALL
8. MOVING OR SPEAKING SO SLOWLY THAT OTHER PEOPLE COULD HAVE NOTICED. OR THE OPPOSITE, BEING SO FIGETY OR RESTLESS THAT YOU HAVE BEEN MOVING AROUND A LOT MORE THAN USUAL: NOT AT ALL
6. FEELING BAD ABOUT YOURSELF - OR THAT YOU ARE A FAILURE OR HAVE LET YOURSELF OR YOUR FAMILY DOWN: NEARLY EVERY DAY
SUM OF ALL RESPONSES TO PHQ9 QUESTIONS 1 AND 2: 5

## 2024-05-03 ASSESSMENT — PAIN SCALES - GENERAL: PAINLEVEL: 0-NO PAIN

## 2024-05-03 NOTE — ASSESSMENT & PLAN NOTE
Relates weight gain to travel and new job.  Does not work out on the road.  Aero .    Wants to start on diet and considers keto diet.    Had sleeve gastrectomy and was able to get down to 230 Pounds.   Discussed treatment options available, reviewed risks and benefits of the tirzepatide, and all questions were answered.  Discussed with pharmacy and will consult for titration.

## 2024-05-03 NOTE — PROGRESS NOTES
"Subjective   Chuck Dunn is a 43 y.o. male who presents for Transfer Of Care (Previous pcp was Dr. Elmore ) and Annual Exam.      Well Adult Physical   Patient here for a comprehensive physical exam.The patient reports no problems ( wants to discuss diets, keto specifically )     Do you take any herbs or supplements that were not prescribed by a doctor? yes   Are you taking calcium supplements? yes   Are you taking aspirin daily? no     History:  Patient receives prostate care outside our clinic  Date last PSA: n/a          Review of Systems   Constitutional:  Negative for chills and fever.   HENT:  Negative for sore throat and trouble swallowing.    Respiratory:  Negative for shortness of breath.    Cardiovascular:  Negative for chest pain, palpitations and leg swelling.   Gastrointestinal:  Negative for abdominal pain.   Skin:  Negative for rash.   All other systems reviewed and are negative.      Objective   /82   Pulse 84   Temp 36.7 °C (98 °F)   Resp 16   Ht 1.803 m (5' 11\")   Wt 147 kg (323 lb 3.2 oz)   SpO2 98%   BMI 45.08 kg/m²       Physical Exam  Constitutional:       Appearance: Normal appearance.   HENT:      Head: Normocephalic.      Mouth/Throat:      Mouth: Mucous membranes are moist.      Pharynx: Oropharynx is clear.      Comments: Type IV oral anatomy    Eyes:      Conjunctiva/sclera: Conjunctivae normal.   Cardiovascular:      Rate and Rhythm: Normal rate and regular rhythm.      Heart sounds: Normal heart sounds.   Pulmonary:      Effort: Pulmonary effort is normal.      Breath sounds: Normal breath sounds.   Abdominal:      General: Abdomen is protuberant.      Palpations: Abdomen is soft.      Tenderness: There is no abdominal tenderness.      Comments: Large pannus     Musculoskeletal:      Cervical back: Neck supple.   Skin:     General: Skin is warm and dry.   Neurological:      Mental Status: He is alert.         Assessment/Plan   Problem List Items Addressed This " Visit       Class 3 severe obesity due to excess calories without serious comorbidity with body mass index (BMI) of 45.0 to 49.9 in adult (Multi)     Relates weight gain to travel and new job.  Does not work out on the road.  [employer] .    Wants to start on diet and considers keto diet.    Had sleeve gastrectomy and was able to get down to 230 Pounds.          Moderate episode of recurrent major depressive disorder (Multi)    ROSA ELENA (obstructive sleep apnea) - Primary     Other Visit Diagnoses       Encounter for prostate cancer screening        Well adult health check              Encounter Diagnoses   Name Primary?    ROSA ELENA (obstructive sleep apnea) Yes    Class 3 severe obesity due to excess calories without serious comorbidity with body mass index (BMI) of 45.0 to 49.9 in adult (Multi)     Moderate episode of recurrent major depressive disorder (Multi)     Encounter for prostate cancer screening     Well adult health check      Willy Calzada, DO

## 2024-05-17 ENCOUNTER — ANESTHESIA (OUTPATIENT)
Dept: OPERATING ROOM | Facility: HOSPITAL | Age: 44
End: 2024-05-17
Payer: COMMERCIAL

## 2024-05-17 ENCOUNTER — HOSPITAL ENCOUNTER (OUTPATIENT)
Facility: HOSPITAL | Age: 44
Setting detail: OBSERVATION
LOS: 1 days | Discharge: HOME | End: 2024-05-18
Attending: STUDENT IN AN ORGANIZED HEALTH CARE EDUCATION/TRAINING PROGRAM | Admitting: SURGERY
Payer: COMMERCIAL

## 2024-05-17 ENCOUNTER — ANESTHESIA EVENT (OUTPATIENT)
Dept: OPERATING ROOM | Facility: HOSPITAL | Age: 44
End: 2024-05-17
Payer: COMMERCIAL

## 2024-05-17 ENCOUNTER — APPOINTMENT (OUTPATIENT)
Dept: RADIOLOGY | Facility: HOSPITAL | Age: 44
End: 2024-05-17
Payer: COMMERCIAL

## 2024-05-17 DIAGNOSIS — K35.890 OTHER ACUTE APPENDICITIS: ICD-10-CM

## 2024-05-17 DIAGNOSIS — K37 APPENDICITIS, UNSPECIFIED APPENDICITIS TYPE: ICD-10-CM

## 2024-05-17 DIAGNOSIS — K35.891 OTHER ACUTE APPENDICITIS WITHOUT PERFORATION, WITH GANGRENE: Primary | ICD-10-CM

## 2024-05-17 PROBLEM — E55.9 VITAMIN D DEFICIENCY: Status: RESOLVED | Noted: 2023-02-14 | Resolved: 2024-05-17

## 2024-05-17 PROBLEM — I10 HTN (HYPERTENSION): Status: RESOLVED | Noted: 2023-02-14 | Resolved: 2024-05-17

## 2024-05-17 PROBLEM — G47.33 OSA (OBSTRUCTIVE SLEEP APNEA): Status: RESOLVED | Noted: 2024-05-03 | Resolved: 2024-05-17

## 2024-05-17 PROBLEM — Z90.3 HISTORY OF SLEEVE GASTRECTOMY: Status: RESOLVED | Noted: 2023-02-14 | Resolved: 2024-05-17

## 2024-05-17 PROBLEM — K21.9 GERD (GASTROESOPHAGEAL REFLUX DISEASE): Status: RESOLVED | Noted: 2023-02-14 | Resolved: 2024-05-17

## 2024-05-17 PROBLEM — E66.813 CLASS 3 SEVERE OBESITY DUE TO EXCESS CALORIES WITHOUT SERIOUS COMORBIDITY WITH BODY MASS INDEX (BMI) OF 45.0 TO 49.9 IN ADULT: Status: RESOLVED | Noted: 2023-02-14 | Resolved: 2024-05-17

## 2024-05-17 PROBLEM — R20.2 PARESTHESIA OF LOWER EXTREMITY: Status: RESOLVED | Noted: 2023-02-14 | Resolved: 2024-05-17

## 2024-05-17 PROBLEM — E66.01 CLASS 3 SEVERE OBESITY DUE TO EXCESS CALORIES WITHOUT SERIOUS COMORBIDITY WITH BODY MASS INDEX (BMI) OF 45.0 TO 49.9 IN ADULT (MULTI): Status: RESOLVED | Noted: 2023-02-14 | Resolved: 2024-05-17

## 2024-05-17 PROBLEM — F33.1 MODERATE EPISODE OF RECURRENT MAJOR DEPRESSIVE DISORDER (MULTI): Status: RESOLVED | Noted: 2023-04-27 | Resolved: 2024-05-17

## 2024-05-17 LAB
ALBUMIN SERPL BCP-MCNC: 4.5 G/DL (ref 3.4–5)
ALP SERPL-CCNC: 39 U/L (ref 33–120)
ALT SERPL W P-5'-P-CCNC: 37 U/L (ref 10–52)
ANION GAP SERPL CALC-SCNC: 12 MMOL/L (ref 10–20)
APPEARANCE UR: CLEAR
AST SERPL W P-5'-P-CCNC: 18 U/L (ref 9–39)
BASOPHILS # BLD AUTO: 0.05 X10*3/UL (ref 0–0.1)
BASOPHILS NFR BLD AUTO: 0.4 %
BILIRUB SERPL-MCNC: 0.5 MG/DL (ref 0–1.2)
BILIRUB UR STRIP.AUTO-MCNC: NEGATIVE MG/DL
BUN SERPL-MCNC: 13 MG/DL (ref 6–23)
CALCIUM SERPL-MCNC: 9.5 MG/DL (ref 8.6–10.3)
CHLORIDE SERPL-SCNC: 101 MMOL/L (ref 98–107)
CO2 SERPL-SCNC: 29 MMOL/L (ref 21–32)
COLOR UR: YELLOW
CREAT SERPL-MCNC: 0.81 MG/DL (ref 0.5–1.3)
EGFRCR SERPLBLD CKD-EPI 2021: >90 ML/MIN/1.73M*2
EOSINOPHIL # BLD AUTO: 0.1 X10*3/UL (ref 0–0.7)
EOSINOPHIL NFR BLD AUTO: 0.8 %
ERYTHROCYTE [DISTWIDTH] IN BLOOD BY AUTOMATED COUNT: 13.2 % (ref 11.5–14.5)
GLUCOSE SERPL-MCNC: 105 MG/DL (ref 74–99)
GLUCOSE UR STRIP.AUTO-MCNC: NORMAL MG/DL
HCT VFR BLD AUTO: 43.7 % (ref 41–52)
HGB BLD-MCNC: 14.4 G/DL (ref 13.5–17.5)
IMM GRANULOCYTES # BLD AUTO: 0.05 X10*3/UL (ref 0–0.7)
IMM GRANULOCYTES NFR BLD AUTO: 0.4 % (ref 0–0.9)
KETONES UR STRIP.AUTO-MCNC: NEGATIVE MG/DL
LEUKOCYTE ESTERASE UR QL STRIP.AUTO: ABNORMAL
LIPASE SERPL-CCNC: 17 U/L (ref 9–82)
LYMPHOCYTES # BLD AUTO: 2.01 X10*3/UL (ref 1.2–4.8)
LYMPHOCYTES NFR BLD AUTO: 15.5 %
MCH RBC QN AUTO: 29.7 PG (ref 26–34)
MCHC RBC AUTO-ENTMCNC: 33 G/DL (ref 32–36)
MCV RBC AUTO: 90 FL (ref 80–100)
MONOCYTES # BLD AUTO: 0.87 X10*3/UL (ref 0.1–1)
MONOCYTES NFR BLD AUTO: 6.7 %
MUCOUS THREADS #/AREA URNS AUTO: NORMAL /LPF
NEUTROPHILS # BLD AUTO: 9.9 X10*3/UL (ref 1.2–7.7)
NEUTROPHILS NFR BLD AUTO: 76.2 %
NITRITE UR QL STRIP.AUTO: NEGATIVE
NRBC BLD-RTO: 0 /100 WBCS (ref 0–0)
PH UR STRIP.AUTO: 5.5 [PH]
PLATELET # BLD AUTO: 281 X10*3/UL (ref 150–450)
POTASSIUM SERPL-SCNC: 3.5 MMOL/L (ref 3.5–5.3)
PROT SERPL-MCNC: 7.1 G/DL (ref 6.4–8.2)
PROT UR STRIP.AUTO-MCNC: ABNORMAL MG/DL
RBC # BLD AUTO: 4.85 X10*6/UL (ref 4.5–5.9)
RBC # UR STRIP.AUTO: NEGATIVE /UL
RBC #/AREA URNS AUTO: NORMAL /HPF
SODIUM SERPL-SCNC: 138 MMOL/L (ref 136–145)
SP GR UR STRIP.AUTO: 1.03
UROBILINOGEN UR STRIP.AUTO-MCNC: ABNORMAL MG/DL
WBC # BLD AUTO: 13 X10*3/UL (ref 4.4–11.3)
WBC #/AREA URNS AUTO: NORMAL /HPF

## 2024-05-17 PROCEDURE — 2500000004 HC RX 250 GENERAL PHARMACY W/ HCPCS (ALT 636 FOR OP/ED): Performed by: SURGERY

## 2024-05-17 PROCEDURE — A44970 PR LAP,APPENDECTOMY: Performed by: ANESTHESIOLOGIST ASSISTANT

## 2024-05-17 PROCEDURE — 83690 ASSAY OF LIPASE: CPT

## 2024-05-17 PROCEDURE — 2550000001 HC RX 255 CONTRASTS: Performed by: STUDENT IN AN ORGANIZED HEALTH CARE EDUCATION/TRAINING PROGRAM

## 2024-05-17 PROCEDURE — 96365 THER/PROPH/DIAG IV INF INIT: CPT | Mod: 59

## 2024-05-17 PROCEDURE — 80053 COMPREHEN METABOLIC PANEL: CPT

## 2024-05-17 PROCEDURE — 3700000001 HC GENERAL ANESTHESIA TIME - INITIAL BASE CHARGE: Performed by: SURGERY

## 2024-05-17 PROCEDURE — 74177 CT ABD & PELVIS W/CONTRAST: CPT

## 2024-05-17 PROCEDURE — 0752T DGTZ GLS MCRSCP SLD LVL III: CPT | Mod: TC,STJLAB | Performed by: SURGERY

## 2024-05-17 PROCEDURE — 81001 URINALYSIS AUTO W/SCOPE: CPT

## 2024-05-17 PROCEDURE — 2500000004 HC RX 250 GENERAL PHARMACY W/ HCPCS (ALT 636 FOR OP/ED): Performed by: ANESTHESIOLOGIST ASSISTANT

## 2024-05-17 PROCEDURE — 7100000002 HC RECOVERY ROOM TIME - EACH INCREMENTAL 1 MINUTE: Performed by: SURGERY

## 2024-05-17 PROCEDURE — 3700000002 HC GENERAL ANESTHESIA TIME - EACH INCREMENTAL 1 MINUTE: Performed by: SURGERY

## 2024-05-17 PROCEDURE — 36415 COLL VENOUS BLD VENIPUNCTURE: CPT

## 2024-05-17 PROCEDURE — 96372 THER/PROPH/DIAG INJ SC/IM: CPT | Performed by: ANESTHESIOLOGIST ASSISTANT

## 2024-05-17 PROCEDURE — 2500000004 HC RX 250 GENERAL PHARMACY W/ HCPCS (ALT 636 FOR OP/ED)

## 2024-05-17 PROCEDURE — 96367 TX/PROPH/DG ADDL SEQ IV INF: CPT

## 2024-05-17 PROCEDURE — 44970 LAPAROSCOPY APPENDECTOMY: CPT | Performed by: SURGERY

## 2024-05-17 PROCEDURE — 99285 EMERGENCY DEPT VISIT HI MDM: CPT | Mod: 25

## 2024-05-17 PROCEDURE — 2720000007 HC OR 272 NO HCPCS: Performed by: SURGERY

## 2024-05-17 PROCEDURE — 96367 TX/PROPH/DG ADDL SEQ IV INF: CPT | Mod: 59

## 2024-05-17 PROCEDURE — 96365 THER/PROPH/DIAG IV INF INIT: CPT

## 2024-05-17 PROCEDURE — 96375 TX/PRO/DX INJ NEW DRUG ADDON: CPT | Mod: 59

## 2024-05-17 PROCEDURE — 99222 1ST HOSP IP/OBS MODERATE 55: CPT | Performed by: SURGERY

## 2024-05-17 PROCEDURE — 7100000001 HC RECOVERY ROOM TIME - INITIAL BASE CHARGE: Performed by: SURGERY

## 2024-05-17 PROCEDURE — 74177 CT ABD & PELVIS W/CONTRAST: CPT | Performed by: STUDENT IN AN ORGANIZED HEALTH CARE EDUCATION/TRAINING PROGRAM

## 2024-05-17 PROCEDURE — 3600000004 HC OR TIME - INITIAL BASE CHARGE - PROCEDURE LEVEL FOUR: Performed by: SURGERY

## 2024-05-17 PROCEDURE — 2500000005 HC RX 250 GENERAL PHARMACY W/O HCPCS: Performed by: ANESTHESIOLOGIST ASSISTANT

## 2024-05-17 PROCEDURE — A44970 PR LAP,APPENDECTOMY: Performed by: ANESTHESIOLOGY

## 2024-05-17 PROCEDURE — 3600000009 HC OR TIME - EACH INCREMENTAL 1 MINUTE - PROCEDURE LEVEL FOUR: Performed by: SURGERY

## 2024-05-17 PROCEDURE — 85025 COMPLETE CBC W/AUTO DIFF WBC: CPT

## 2024-05-17 PROCEDURE — A4217 STERILE WATER/SALINE, 500 ML: HCPCS | Performed by: SURGERY

## 2024-05-17 PROCEDURE — 88304 TISSUE EXAM BY PATHOLOGIST: CPT | Performed by: PATHOLOGY

## 2024-05-17 RX ORDER — HEPARIN SODIUM 5000 [USP'U]/ML
INJECTION, SOLUTION INTRAVENOUS; SUBCUTANEOUS AS NEEDED
Status: DISCONTINUED | OUTPATIENT
Start: 2024-05-17 | End: 2024-05-17

## 2024-05-17 RX ORDER — PROPOFOL 10 MG/ML
INJECTION, EMULSION INTRAVENOUS AS NEEDED
Status: DISCONTINUED | OUTPATIENT
Start: 2024-05-17 | End: 2024-05-17

## 2024-05-17 RX ORDER — ONDANSETRON HYDROCHLORIDE 2 MG/ML
INJECTION, SOLUTION INTRAVENOUS AS NEEDED
Status: DISCONTINUED | OUTPATIENT
Start: 2024-05-17 | End: 2024-05-17

## 2024-05-17 RX ORDER — HYDRALAZINE HYDROCHLORIDE 20 MG/ML
5 INJECTION INTRAMUSCULAR; INTRAVENOUS EVERY 30 MIN PRN
Status: DISCONTINUED | OUTPATIENT
Start: 2024-05-17 | End: 2024-05-18 | Stop reason: HOSPADM

## 2024-05-17 RX ORDER — SODIUM CHLORIDE 0.9 G/100ML
IRRIGANT IRRIGATION AS NEEDED
Status: DISCONTINUED | OUTPATIENT
Start: 2024-05-17 | End: 2024-05-18 | Stop reason: HOSPADM

## 2024-05-17 RX ORDER — KETOROLAC TROMETHAMINE 30 MG/ML
30 INJECTION, SOLUTION INTRAMUSCULAR; INTRAVENOUS ONCE
Status: COMPLETED | OUTPATIENT
Start: 2024-05-17 | End: 2024-05-17

## 2024-05-17 RX ORDER — HYDROCODONE BITARTRATE AND ACETAMINOPHEN 5; 325 MG/1; MG/1
1 TABLET ORAL EVERY 4 HOURS PRN
Status: DISCONTINUED | OUTPATIENT
Start: 2024-05-17 | End: 2024-05-18 | Stop reason: HOSPADM

## 2024-05-17 RX ORDER — MIDAZOLAM HYDROCHLORIDE 1 MG/ML
INJECTION, SOLUTION INTRAMUSCULAR; INTRAVENOUS AS NEEDED
Status: DISCONTINUED | OUTPATIENT
Start: 2024-05-17 | End: 2024-05-17

## 2024-05-17 RX ORDER — METOPROLOL TARTRATE 1 MG/ML
INJECTION, SOLUTION INTRAVENOUS AS NEEDED
Status: DISCONTINUED | OUTPATIENT
Start: 2024-05-17 | End: 2024-05-17

## 2024-05-17 RX ORDER — MIDAZOLAM HYDROCHLORIDE 1 MG/ML
1 INJECTION, SOLUTION INTRAMUSCULAR; INTRAVENOUS ONCE AS NEEDED
Status: DISCONTINUED | OUTPATIENT
Start: 2024-05-17 | End: 2024-05-18 | Stop reason: HOSPADM

## 2024-05-17 RX ORDER — HEPARIN SODIUM 5000 [USP'U]/ML
5000 INJECTION, SOLUTION INTRAVENOUS; SUBCUTANEOUS
Status: DISCONTINUED | OUTPATIENT
Start: 2024-05-17 | End: 2024-05-18

## 2024-05-17 RX ORDER — METOCLOPRAMIDE HYDROCHLORIDE 5 MG/ML
10 INJECTION INTRAMUSCULAR; INTRAVENOUS ONCE AS NEEDED
Status: DISCONTINUED | OUTPATIENT
Start: 2024-05-17 | End: 2024-05-18 | Stop reason: HOSPADM

## 2024-05-17 RX ORDER — FENTANYL CITRATE 50 UG/ML
INJECTION, SOLUTION INTRAMUSCULAR; INTRAVENOUS AS NEEDED
Status: DISCONTINUED | OUTPATIENT
Start: 2024-05-17 | End: 2024-05-17

## 2024-05-17 RX ORDER — ROCURONIUM BROMIDE 50 MG/5 ML
SYRINGE (ML) INTRAVENOUS AS NEEDED
Status: DISCONTINUED | OUTPATIENT
Start: 2024-05-17 | End: 2024-05-17

## 2024-05-17 RX ORDER — SUCCINYLCHOLINE/SOD CL,ISO/PF 100 MG/5ML
SYRINGE (ML) INTRAVENOUS AS NEEDED
Status: DISCONTINUED | OUTPATIENT
Start: 2024-05-17 | End: 2024-05-17

## 2024-05-17 RX ORDER — HYDROMORPHONE HYDROCHLORIDE 1 MG/ML
INJECTION, SOLUTION INTRAMUSCULAR; INTRAVENOUS; SUBCUTANEOUS AS NEEDED
Status: DISCONTINUED | OUTPATIENT
Start: 2024-05-17 | End: 2024-05-17

## 2024-05-17 RX ORDER — MORPHINE SULFATE 4 MG/ML
4 INJECTION, SOLUTION INTRAMUSCULAR; INTRAVENOUS ONCE
Status: COMPLETED | OUTPATIENT
Start: 2024-05-17 | End: 2024-05-17

## 2024-05-17 RX ORDER — METRONIDAZOLE 500 MG/100ML
500 INJECTION, SOLUTION INTRAVENOUS ONCE
Status: COMPLETED | OUTPATIENT
Start: 2024-05-17 | End: 2024-05-17

## 2024-05-17 RX ORDER — ALBUTEROL SULFATE 0.83 MG/ML
2.5 SOLUTION RESPIRATORY (INHALATION)
Status: DISCONTINUED | OUTPATIENT
Start: 2024-05-17 | End: 2024-05-18 | Stop reason: HOSPADM

## 2024-05-17 RX ORDER — CEFTRIAXONE 1 G/50ML
1 INJECTION, SOLUTION INTRAVENOUS ONCE
Status: COMPLETED | OUTPATIENT
Start: 2024-05-17 | End: 2024-05-17

## 2024-05-17 RX ORDER — LABETALOL HYDROCHLORIDE 5 MG/ML
5 INJECTION, SOLUTION INTRAVENOUS
Status: DISCONTINUED | OUTPATIENT
Start: 2024-05-17 | End: 2024-05-18 | Stop reason: HOSPADM

## 2024-05-17 RX ORDER — HYDROMORPHONE HYDROCHLORIDE 1 MG/ML
1 INJECTION, SOLUTION INTRAMUSCULAR; INTRAVENOUS; SUBCUTANEOUS EVERY 5 MIN PRN
Status: DISCONTINUED | OUTPATIENT
Start: 2024-05-17 | End: 2024-05-18 | Stop reason: HOSPADM

## 2024-05-17 RX ORDER — SODIUM CHLORIDE, SODIUM LACTATE, POTASSIUM CHLORIDE, CALCIUM CHLORIDE 600; 310; 30; 20 MG/100ML; MG/100ML; MG/100ML; MG/100ML
100 INJECTION, SOLUTION INTRAVENOUS CONTINUOUS
Status: DISCONTINUED | OUTPATIENT
Start: 2024-05-18 | End: 2024-05-18 | Stop reason: HOSPADM

## 2024-05-17 RX ORDER — DIPHENHYDRAMINE HYDROCHLORIDE 50 MG/ML
12.5 INJECTION INTRAMUSCULAR; INTRAVENOUS ONCE AS NEEDED
Status: DISCONTINUED | OUTPATIENT
Start: 2024-05-17 | End: 2024-05-18 | Stop reason: HOSPADM

## 2024-05-17 RX ORDER — SODIUM CHLORIDE, SODIUM LACTATE, POTASSIUM CHLORIDE, CALCIUM CHLORIDE 600; 310; 30; 20 MG/100ML; MG/100ML; MG/100ML; MG/100ML
INJECTION, SOLUTION INTRAVENOUS CONTINUOUS PRN
Status: DISCONTINUED | OUTPATIENT
Start: 2024-05-17 | End: 2024-05-17

## 2024-05-17 RX ORDER — FAMOTIDINE 10 MG/ML
INJECTION INTRAVENOUS AS NEEDED
Status: DISCONTINUED | OUTPATIENT
Start: 2024-05-17 | End: 2024-05-17

## 2024-05-17 RX ADMIN — CEFTRIAXONE SODIUM 1 G: 1 INJECTION, SOLUTION INTRAVENOUS at 21:18

## 2024-05-17 RX ADMIN — SODIUM CHLORIDE, POTASSIUM CHLORIDE, SODIUM LACTATE AND CALCIUM CHLORIDE: 600; 310; 30; 20 INJECTION, SOLUTION INTRAVENOUS at 22:26

## 2024-05-17 RX ADMIN — FENTANYL CITRATE 100 MCG: 50 INJECTION, SOLUTION INTRAMUSCULAR; INTRAVENOUS at 22:32

## 2024-05-17 RX ADMIN — MIDAZOLAM 2 MG: 1 INJECTION INTRAMUSCULAR; INTRAVENOUS at 22:26

## 2024-05-17 RX ADMIN — MORPHINE SULFATE 4 MG: 4 INJECTION, SOLUTION INTRAMUSCULAR; INTRAVENOUS at 18:31

## 2024-05-17 RX ADMIN — HEPARIN SODIUM 5000 UNITS: 5000 INJECTION INTRAVENOUS; SUBCUTANEOUS at 22:39

## 2024-05-17 RX ADMIN — PROPOFOL 50 MG: 10 INJECTION, EMULSION INTRAVENOUS at 23:16

## 2024-05-17 RX ADMIN — Medication 160 MG: at 22:32

## 2024-05-17 RX ADMIN — ONDANSETRON 4 MG: 2 INJECTION INTRAMUSCULAR; INTRAVENOUS at 23:31

## 2024-05-17 RX ADMIN — PROPOFOL 50 MG: 10 INJECTION, EMULSION INTRAVENOUS at 23:23

## 2024-05-17 RX ADMIN — METRONIDAZOLE 500 MG: 500 INJECTION, SOLUTION INTRAVENOUS at 21:53

## 2024-05-17 RX ADMIN — KETOROLAC TROMETHAMINE 30 MG: 30 INJECTION, SOLUTION INTRAMUSCULAR at 17:49

## 2024-05-17 RX ADMIN — PROPOFOL 200 MG: 10 INJECTION, EMULSION INTRAVENOUS at 22:32

## 2024-05-17 RX ADMIN — PROPOFOL 50 MG: 10 INJECTION, EMULSION INTRAVENOUS at 23:05

## 2024-05-17 RX ADMIN — HYDROMORPHONE HYDROCHLORIDE 0.5 MG: 1 INJECTION, SOLUTION INTRAMUSCULAR; INTRAVENOUS; SUBCUTANEOUS at 22:48

## 2024-05-17 RX ADMIN — SODIUM CHLORIDE, POTASSIUM CHLORIDE, SODIUM LACTATE AND CALCIUM CHLORIDE 1000 ML: 600; 310; 30; 20 INJECTION, SOLUTION INTRAVENOUS at 21:53

## 2024-05-17 RX ADMIN — METOPROLOL TARTRATE 2 MG: 5 INJECTION INTRAVENOUS at 23:10

## 2024-05-17 RX ADMIN — DEXAMETHASONE SODIUM PHOSPHATE 8 MG: 4 INJECTION, SOLUTION INTRAMUSCULAR; INTRAVENOUS at 22:42

## 2024-05-17 RX ADMIN — Medication 50 MG: at 22:40

## 2024-05-17 RX ADMIN — SUGAMMADEX 400 MG: 100 INJECTION, SOLUTION INTRAVENOUS at 23:31

## 2024-05-17 RX ADMIN — PROPOFOL 50 MG: 10 INJECTION, EMULSION INTRAVENOUS at 23:34

## 2024-05-17 RX ADMIN — HYDROMORPHONE HYDROCHLORIDE 0.5 MG: 1 INJECTION, SOLUTION INTRAMUSCULAR; INTRAVENOUS; SUBCUTANEOUS at 23:05

## 2024-05-17 RX ADMIN — FAMOTIDINE 20 MG: 10 INJECTION, SOLUTION INTRAVENOUS at 22:50

## 2024-05-17 RX ADMIN — IOHEXOL 75 ML: 350 INJECTION, SOLUTION INTRAVENOUS at 18:15

## 2024-05-17 ASSESSMENT — PAIN DESCRIPTION - ORIENTATION: ORIENTATION: RIGHT

## 2024-05-17 ASSESSMENT — LIFESTYLE VARIABLES
EVER FELT BAD OR GUILTY ABOUT YOUR DRINKING: NO
TOTAL SCORE: 0
HAVE YOU EVER FELT YOU SHOULD CUT DOWN ON YOUR DRINKING: NO
EVER HAD A DRINK FIRST THING IN THE MORNING TO STEADY YOUR NERVES TO GET RID OF A HANGOVER: NO
HAVE PEOPLE ANNOYED YOU BY CRITICIZING YOUR DRINKING: NO

## 2024-05-17 ASSESSMENT — PAIN - FUNCTIONAL ASSESSMENT
PAIN_FUNCTIONAL_ASSESSMENT: 0-10

## 2024-05-17 ASSESSMENT — PAIN DESCRIPTION - PROGRESSION: CLINICAL_PROGRESSION: NOT CHANGED

## 2024-05-17 ASSESSMENT — COLUMBIA-SUICIDE SEVERITY RATING SCALE - C-SSRS
2. HAVE YOU ACTUALLY HAD ANY THOUGHTS OF KILLING YOURSELF?: NO
1. IN THE PAST MONTH, HAVE YOU WISHED YOU WERE DEAD OR WISHED YOU COULD GO TO SLEEP AND NOT WAKE UP?: NO
6. HAVE YOU EVER DONE ANYTHING, STARTED TO DO ANYTHING, OR PREPARED TO DO ANYTHING TO END YOUR LIFE?: NO

## 2024-05-17 ASSESSMENT — PAIN DESCRIPTION - DESCRIPTORS
DESCRIPTORS: ACHING
DESCRIPTORS: ACHING

## 2024-05-17 ASSESSMENT — PAIN SCALES - GENERAL
PAINLEVEL_OUTOF10: 2
PAINLEVEL_OUTOF10: 6
PAINLEVEL_OUTOF10: 7
PAINLEVEL_OUTOF10: 2

## 2024-05-17 ASSESSMENT — PAIN DESCRIPTION - FREQUENCY: FREQUENCY: INTERMITTENT

## 2024-05-17 ASSESSMENT — PAIN DESCRIPTION - LOCATION: LOCATION: ABDOMEN

## 2024-05-17 ASSESSMENT — PAIN DESCRIPTION - ONSET: ONSET: ONGOING

## 2024-05-17 NOTE — ED PROVIDER NOTES
HPI   Chief Complaint   Patient presents with   • Abdominal Pain     Right lower abdominal pain started yesterday after mowing the law, gas and burping,    • Diarrhea       Patient is a 43-year-old male with history of gastric sleeve, left inguinal hernia repair presenting [facility] for right lower quadrant pain.  Patient reported that after mowing the lawn yesterday evening he started having this right lower quadrant pain.  Patient said pain has been intermittent but worsening progressively starting this morning.  Patient said there was some worsening with food.  Patient did feel he was gassy and took Gas-X, Beano.  Patient had no relief.  Patient has not taken any NSAIDs for pain.  Patient reports some nausea and diarrhea.  Patient denies any active chest pain, shortness of breath, vomiting, fever, chills, headache, change in vision, or weakness.                        [place] Coma Scale Score: 15                     Patient History   Past Medical History:   Diagnosis Date   • Anxiety    • Arthritis    • Depression    • GERD (gastroesophageal reflux disease)    • Hypertension      Past Surgical History:   Procedure Laterality Date   • BARIATRIC SURGERY     • OTHER SURGICAL HISTORY  03/23/2021    Sleeve gastrectomy     Family History   Problem Relation Name Age of Onset   • Other (Diabetes mellitus) Mother     • Heart disease Mother     • Diabetes Mother       Social History     Tobacco Use   • Smoking status: Every Day     Current packs/day: 0.25     Average packs/day: 0.3 packs/day for 15.4 years (3.8 ttl pk-yrs)     Types: Cigarettes     Start date: 2009   • Smokeless tobacco: Never   Vaping Use   • Vaping status: Never Used   Substance Use Topics   • Alcohol use: Yes     Alcohol/week: 4.0 standard drinks of alcohol     Types: 2 Glasses of wine, 2 Shots of liquor per week     Comment: every other day   • Drug use: Never       Physical Exam   ED Triage Vitals [05/17/24 1628]   Temperature Heart Rate  Respirations BP   36.2 °C (97.2 °F) 87 18 148/83      Pulse Ox Temp Source Heart Rate Source Patient Position   99 % Temporal Monitor Sitting      BP Location FiO2 (%)     Right arm --       Physical Exam  Constitutional:       Appearance: Normal appearance. He is obese.   HENT:      Head: Normocephalic and atraumatic.      Nose: Nose normal.      Mouth/Throat:      Mouth: Mucous membranes are moist.      Pharynx: Oropharynx is clear.   Eyes:      Extraocular Movements: Extraocular movements intact.      Conjunctiva/sclera: Conjunctivae normal.      Pupils: Pupils are equal, round, and reactive to light.   Cardiovascular:      Rate and Rhythm: Normal rate and regular rhythm.      Pulses: Normal pulses.      Heart sounds: Normal heart sounds.   Pulmonary:      Effort: Pulmonary effort is normal.      Breath sounds: Normal breath sounds.   Abdominal:      General: Abdomen is flat. Bowel sounds are normal.      Palpations: Abdomen is soft.      Tenderness: There is generalized abdominal tenderness and tenderness in the right lower quadrant. Positive signs include psoas sign.   Musculoskeletal:         General: Normal range of motion.      Cervical back: Normal range of motion and neck supple.   Skin:     General: Skin is warm and dry.      Capillary Refill: Capillary refill takes less than 2 seconds.   Neurological:      General: No focal deficit present.      Mental Status: He is alert and oriented to person, place, and time. Mental status is at baseline.   Psychiatric:         Mood and Affect: Mood normal.         Behavior: Behavior normal.       ED Course & MDM        Medical Decision Making  Patient is a 43 y.o. male who presents to [facility] ED for Abdominal Pain (Right lower abdominal pain started yesterday after mowing the law, gas and burping, ) and Diarrhea. On initial ED evaluation, patient found to be in mild distress. Per HPI, concern to evaluate and treat for possible appendicitis versus other acute  intra-abdominal pathology.  Obtaining abdominal labs including CBC, CMP, lipase.  Obtaining CT abdomen pelvis with IV contrast.  Patient given Toradol IV for pain.  Patient given 4 mg morphine for pain.  Lab work remarkable for leukocytosis of 13.0.  Remaining lab work reviewed, grossly unremarkable.  CT abdomen pelvis IV contrast notable for acute appendicitis with periappendiceal fat stranding.  General surgery consulted and recommended surgical intervention.  Patient started IV fluids, made n.p.o., started on surgical prophylaxis Rocephin and metronidazole.  Patient admitted to general surgery service under Dr. Salas.          Procedure  Procedures     Monae Alatorre MD  Resident  05/17/24 8370

## 2024-05-18 VITALS
WEIGHT: 315 LBS | HEART RATE: 84 BPM | DIASTOLIC BLOOD PRESSURE: 93 MMHG | OXYGEN SATURATION: 94 % | SYSTOLIC BLOOD PRESSURE: 150 MMHG | BODY MASS INDEX: 44.1 KG/M2 | HEIGHT: 71 IN | RESPIRATION RATE: 20 BRPM | TEMPERATURE: 97.2 F

## 2024-05-18 LAB
ANION GAP SERPL CALC-SCNC: 11 MMOL/L (ref 10–20)
BUN SERPL-MCNC: 14 MG/DL (ref 6–23)
CALCIUM SERPL-MCNC: 9.1 MG/DL (ref 8.6–10.3)
CHLORIDE SERPL-SCNC: 101 MMOL/L (ref 98–107)
CO2 SERPL-SCNC: 27 MMOL/L (ref 21–32)
CREAT SERPL-MCNC: 0.73 MG/DL (ref 0.5–1.3)
EGFRCR SERPLBLD CKD-EPI 2021: >90 ML/MIN/1.73M*2
ERYTHROCYTE [DISTWIDTH] IN BLOOD BY AUTOMATED COUNT: 13.4 % (ref 11.5–14.5)
GLUCOSE SERPL-MCNC: 138 MG/DL (ref 74–99)
HCT VFR BLD AUTO: 41 % (ref 41–52)
HGB BLD-MCNC: 13.3 G/DL (ref 13.5–17.5)
HOLD SPECIMEN: NORMAL
MCH RBC QN AUTO: 29.5 PG (ref 26–34)
MCHC RBC AUTO-ENTMCNC: 32.4 G/DL (ref 32–36)
MCV RBC AUTO: 91 FL (ref 80–100)
NRBC BLD-RTO: 0 /100 WBCS (ref 0–0)
PLATELET # BLD AUTO: 257 X10*3/UL (ref 150–450)
POTASSIUM SERPL-SCNC: 4 MMOL/L (ref 3.5–5.3)
RBC # BLD AUTO: 4.51 X10*6/UL (ref 4.5–5.9)
SODIUM SERPL-SCNC: 135 MMOL/L (ref 136–145)
WBC # BLD AUTO: 13.6 X10*3/UL (ref 4.4–11.3)

## 2024-05-18 PROCEDURE — 1200000002 HC GENERAL ROOM WITH TELEMETRY DAILY

## 2024-05-18 PROCEDURE — G0378 HOSPITAL OBSERVATION PER HR: HCPCS

## 2024-05-18 PROCEDURE — 99024 POSTOP FOLLOW-UP VISIT: CPT | Performed by: SURGERY

## 2024-05-18 PROCEDURE — 85027 COMPLETE CBC AUTOMATED: CPT | Performed by: NURSE PRACTITIONER

## 2024-05-18 PROCEDURE — 36415 COLL VENOUS BLD VENIPUNCTURE: CPT | Performed by: NURSE PRACTITIONER

## 2024-05-18 PROCEDURE — 82374 ASSAY BLOOD CARBON DIOXIDE: CPT | Performed by: NURSE PRACTITIONER

## 2024-05-18 PROCEDURE — 2500000004 HC RX 250 GENERAL PHARMACY W/ HCPCS (ALT 636 FOR OP/ED): Performed by: NURSE PRACTITIONER

## 2024-05-18 PROCEDURE — 96375 TX/PRO/DX INJ NEW DRUG ADDON: CPT

## 2024-05-18 PROCEDURE — 80048 BASIC METABOLIC PNL TOTAL CA: CPT | Performed by: NURSE PRACTITIONER

## 2024-05-18 PROCEDURE — 2500000001 HC RX 250 WO HCPCS SELF ADMINISTERED DRUGS (ALT 637 FOR MEDICARE OP): Performed by: NURSE PRACTITIONER

## 2024-05-18 RX ORDER — SODIUM CHLORIDE, SODIUM LACTATE, POTASSIUM CHLORIDE, CALCIUM CHLORIDE 600; 310; 30; 20 MG/100ML; MG/100ML; MG/100ML; MG/100ML
100 INJECTION, SOLUTION INTRAVENOUS CONTINUOUS
Status: DISCONTINUED | OUTPATIENT
Start: 2024-05-18 | End: 2024-05-18 | Stop reason: HOSPADM

## 2024-05-18 RX ORDER — TALC
3 POWDER (GRAM) TOPICAL NIGHTLY PRN
Status: DISCONTINUED | OUTPATIENT
Start: 2024-05-18 | End: 2024-05-18 | Stop reason: HOSPADM

## 2024-05-18 RX ORDER — OXYCODONE HYDROCHLORIDE 5 MG/1
2.5 TABLET ORAL EVERY 6 HOURS PRN
Status: DISCONTINUED | OUTPATIENT
Start: 2024-05-18 | End: 2024-05-18 | Stop reason: HOSPADM

## 2024-05-18 RX ORDER — POLYETHYLENE GLYCOL 3350 17 G/17G
17 POWDER, FOR SOLUTION ORAL DAILY PRN
Status: DISCONTINUED | OUTPATIENT
Start: 2024-05-19 | End: 2024-05-18 | Stop reason: HOSPADM

## 2024-05-18 RX ORDER — OXYCODONE HYDROCHLORIDE 5 MG/1
5 TABLET ORAL EVERY 6 HOURS PRN
Status: DISCONTINUED | OUTPATIENT
Start: 2024-05-18 | End: 2024-05-18 | Stop reason: HOSPADM

## 2024-05-18 RX ORDER — ONDANSETRON 4 MG/1
4 TABLET, ORALLY DISINTEGRATING ORAL EVERY 8 HOURS PRN
Status: DISCONTINUED | OUTPATIENT
Start: 2024-05-18 | End: 2024-05-18 | Stop reason: HOSPADM

## 2024-05-18 RX ORDER — ACETAMINOPHEN 325 MG/1
650 TABLET ORAL EVERY 4 HOURS PRN
Status: DISCONTINUED | OUTPATIENT
Start: 2024-05-18 | End: 2024-05-18 | Stop reason: HOSPADM

## 2024-05-18 RX ORDER — HEPARIN SODIUM 5000 [USP'U]/ML
7500 INJECTION, SOLUTION INTRAVENOUS; SUBCUTANEOUS EVERY 8 HOURS SCHEDULED
Status: DISCONTINUED | OUTPATIENT
Start: 2024-05-18 | End: 2024-05-18 | Stop reason: HOSPADM

## 2024-05-18 RX ORDER — PANTOPRAZOLE SODIUM 40 MG/1
40 TABLET, DELAYED RELEASE ORAL
Status: DISCONTINUED | OUTPATIENT
Start: 2024-05-18 | End: 2024-05-18 | Stop reason: HOSPADM

## 2024-05-18 RX ORDER — ONDANSETRON HYDROCHLORIDE 2 MG/ML
4 INJECTION, SOLUTION INTRAVENOUS EVERY 8 HOURS PRN
Status: DISCONTINUED | OUTPATIENT
Start: 2024-05-18 | End: 2024-05-18 | Stop reason: HOSPADM

## 2024-05-18 RX ORDER — ACETAMINOPHEN AND CODEINE PHOSPHATE 300; 30 MG/1; MG/1
1 TABLET ORAL EVERY 6 HOURS PRN
Qty: 15 TABLET | Refills: 0 | Status: SHIPPED | OUTPATIENT
Start: 2024-05-18

## 2024-05-18 RX ADMIN — OXYCODONE HYDROCHLORIDE 5 MG: 5 TABLET ORAL at 10:50

## 2024-05-18 RX ADMIN — PANTOPRAZOLE SODIUM 40 MG: 40 TABLET, DELAYED RELEASE ORAL at 06:57

## 2024-05-18 RX ADMIN — LOSARTAN POTASSIUM: 100 TABLET, FILM COATED ORAL at 08:22

## 2024-05-18 RX ADMIN — SODIUM CHLORIDE, POTASSIUM CHLORIDE, SODIUM LACTATE AND CALCIUM CHLORIDE 100 ML/HR: 600; 310; 30; 20 INJECTION, SOLUTION INTRAVENOUS at 00:52

## 2024-05-18 RX ADMIN — OXYCODONE HYDROCHLORIDE 5 MG: 5 TABLET ORAL at 03:47

## 2024-05-18 RX ADMIN — CEFOXITIN SODIUM 2 G: 2 POWDER, FOR SOLUTION INTRAVENOUS at 04:27

## 2024-05-18 SDOH — SOCIAL STABILITY: SOCIAL INSECURITY: HAS ANYONE EVER THREATENED TO HURT YOUR FAMILY OR YOUR PETS?: NO

## 2024-05-18 SDOH — SOCIAL STABILITY: SOCIAL INSECURITY: ABUSE: ADULT

## 2024-05-18 SDOH — SOCIAL STABILITY: SOCIAL INSECURITY: DO YOU FEEL UNSAFE GOING BACK TO THE PLACE WHERE YOU ARE LIVING?: NO

## 2024-05-18 SDOH — SOCIAL STABILITY: SOCIAL INSECURITY: WERE YOU ABLE TO COMPLETE ALL THE BEHAVIORAL HEALTH SCREENINGS?: YES

## 2024-05-18 SDOH — SOCIAL STABILITY: SOCIAL INSECURITY: DO YOU FEEL ANYONE HAS EXPLOITED OR TAKEN ADVANTAGE OF YOU FINANCIALLY OR OF YOUR PERSONAL PROPERTY?: NO

## 2024-05-18 SDOH — SOCIAL STABILITY: SOCIAL INSECURITY: DOES ANYONE TRY TO KEEP YOU FROM HAVING/CONTACTING OTHER FRIENDS OR DOING THINGS OUTSIDE YOUR HOME?: NO

## 2024-05-18 SDOH — SOCIAL STABILITY: SOCIAL INSECURITY: ARE THERE ANY APPARENT SIGNS OF INJURIES/BEHAVIORS THAT COULD BE RELATED TO ABUSE/NEGLECT?: NO

## 2024-05-18 SDOH — SOCIAL STABILITY: SOCIAL INSECURITY: HAVE YOU HAD ANY THOUGHTS OF HARMING ANYONE ELSE?: NO

## 2024-05-18 SDOH — SOCIAL STABILITY: SOCIAL INSECURITY: ARE YOU OR HAVE YOU BEEN THREATENED OR ABUSED PHYSICALLY, EMOTIONALLY, OR SEXUALLY BY ANYONE?: NO

## 2024-05-18 SDOH — SOCIAL STABILITY: SOCIAL INSECURITY: HAVE YOU HAD THOUGHTS OF HARMING ANYONE ELSE?: NO

## 2024-05-18 ASSESSMENT — LIFESTYLE VARIABLES
AUDIT-C TOTAL SCORE: 2
HOW OFTEN DO YOU HAVE A DRINK CONTAINING ALCOHOL: 2-4 TIMES A MONTH
HOW OFTEN DO YOU HAVE 6 OR MORE DRINKS ON ONE OCCASION: NEVER
SUBSTANCE_ABUSE_PAST_12_MONTHS: NO
AUDIT-C TOTAL SCORE: 2
SKIP TO QUESTIONS 9-10: 1
HOW MANY STANDARD DRINKS CONTAINING ALCOHOL DO YOU HAVE ON A TYPICAL DAY: 1 OR 2

## 2024-05-18 ASSESSMENT — PAIN - FUNCTIONAL ASSESSMENT
PAIN_FUNCTIONAL_ASSESSMENT: 0-10

## 2024-05-18 ASSESSMENT — PAIN SCALES - GENERAL
PAINLEVEL_OUTOF10: 7
PAINLEVEL_OUTOF10: 7
PAINLEVEL_OUTOF10: 2
PAINLEVEL_OUTOF10: 0 - NO PAIN
PAINLEVEL_OUTOF10: 0 - NO PAIN

## 2024-05-18 ASSESSMENT — COGNITIVE AND FUNCTIONAL STATUS - GENERAL
WALKING IN HOSPITAL ROOM: A LITTLE
HELP NEEDED FOR BATHING: A LITTLE
STANDING UP FROM CHAIR USING ARMS: A LITTLE
PATIENT BASELINE BEDBOUND: NO
TOILETING: A LITTLE
MOVING TO AND FROM BED TO CHAIR: A LITTLE
MOBILITY SCORE: 20
DAILY ACTIVITIY SCORE: 21
DRESSING REGULAR LOWER BODY CLOTHING: A LITTLE
CLIMB 3 TO 5 STEPS WITH RAILING: A LITTLE

## 2024-05-18 ASSESSMENT — ACTIVITIES OF DAILY LIVING (ADL)
HEARING - LEFT EAR: FUNCTIONAL
BATHING: INDEPENDENT
LACK_OF_TRANSPORTATION: NO
DRESSING YOURSELF: INDEPENDENT
JUDGMENT_ADEQUATE_SAFELY_COMPLETE_DAILY_ACTIVITIES: YES
FEEDING YOURSELF: INDEPENDENT
TOILETING: INDEPENDENT
PATIENT'S MEMORY ADEQUATE TO SAFELY COMPLETE DAILY ACTIVITIES?: YES
HEARING - RIGHT EAR: FUNCTIONAL
WALKS IN HOME: INDEPENDENT
GROOMING: INDEPENDENT
ADEQUATE_TO_COMPLETE_ADL: YES

## 2024-05-18 ASSESSMENT — PATIENT HEALTH QUESTIONNAIRE - PHQ9
2. FEELING DOWN, DEPRESSED OR HOPELESS: NOT AT ALL
1. LITTLE INTEREST OR PLEASURE IN DOING THINGS: NOT AT ALL
SUM OF ALL RESPONSES TO PHQ9 QUESTIONS 1 & 2: 0

## 2024-05-18 ASSESSMENT — PAIN DESCRIPTION - LOCATION: LOCATION: ABDOMEN

## 2024-05-18 ASSESSMENT — PAIN DESCRIPTION - ORIENTATION: ORIENTATION: LOWER

## 2024-05-18 NOTE — OP NOTE
Appendectomy Laparoscopy Operative Note     Date: 2024  OR Location: [facility] OR    Name: Chuck Dunn, : 1980, Age: 43 y.o., MRN: [id number], Sex: male    Diagnosis  * No Diagnosis Codes entered * * No Diagnosis Codes entered *     Procedures  Appendectomy Laparoscopy  26735 - [place] LAPAROSCOPIC APPENDECTOMY      Surgeons      * Curtis Salas - Primary    Resident/Fellow/Other Assistant:  Surgeons and Role:  * No surgeons found with a matching role *    Procedure Summary  Anesthesia: General  ASA: III  Anesthesia Staff: Anesthesiologist: Alonso Blue MD  C-AA: DELMI Woodward  Estimated Blood Loss: 50 mL  Intra-op Medications:   Administrations occurring from 2200 to 2305 on 24:   Medication Name Total Dose   sodium chloride 0.9 % irrigation solution 1,000 mL   heparin (porcine) injection 5,000 Units Cannot be calculated              Anesthesia Record               Intraprocedure I/O Totals       None           Specimen:   ID Type Source Tests Collected by Time   1 : appendix Tissue APPENDIX SURGICAL PATHOLOGY EXAM Curtis Salas MD 2024 [id number]        Staff:   Circulator: Inna Wasserman RN  Scrub Person: Levon Patel         Drains and/or Catheters:   Urethral Catheter Non-latex 16 Fr. (Active)       Patient is a 43-year-old male with acute appendicitis.  He presents now for surgery.    The risk benefits indications for surgery reviewed with the patient.  The potential bleeding infection MI PE death etc. reviewed.  The anticipated convalescence is reviewed.  Potential need to convert from a laparoscopic to an open procedure is reviewed.  All questions were answered consent is obtained    Note made of the patient's morbid obesity.  He understands he is at higher than average risk for adverse events.    Patient is taken to the operating room placed on table in supine position.  General esthesia obtained.  Cueva catheter was placed.  Abdomen prepped and draped in a sterile  fashion.  Timeout procedures were followed.  Antibiotics were given.  DVT prophylaxis obtained using subcutaneous heparin placement of external MAC pression stockings.    Vertical incision is made in the supraumbilical midline and dissection carried down to the midline fascia.  This is open the abdomen entered.  There are no adhesions at this level.  Blunt Armstrong trocar secured.  Pneumoperitoneum obtained.  Patient placed in a headdown position rolled was left.    A 5 mm ports placed in the right lower quadrant.  Another 5 and a port is placed in the right upper quadrant.  All ports were placed under direct vision    Appendix is identified and inflamed in its distal half.  Not perforated.  The base is not involved in the inflammatory process    Bendix is held on traction the mesoappendix divided with energy device.  This was reinforced with a 10 mm clips.  Pennox was cleared down to its base.  Appendix now transected with an application of the stapler.  Next placed in a bag and removed from the field    Staple lines in the mesentery inspected.  Hemostasis is good counts reported as correct    The cutdown site as well as the right upper quadrant port site are closed interrupted 0 Vicryl.  The skin wounds closed staples    Patient tolerated procedure well          Curtis Salas  Phone Number: [phone]

## 2024-05-18 NOTE — ANESTHESIA POSTPROCEDURE EVALUATION
Patient: Chuck Dunn    Procedure Summary       Date: 05/17/24 Room / Location: [facility] OR 04 / [facility] OR    Anesthesia Start: 2226 Anesthesia Stop: 2340    Procedure: Appendectomy Laparoscopy Diagnosis: (appendicitis)    Surgeons: Curtis Salas MD Responsible Provider: Alonso Blue MD    Anesthesia Type: general ASA Status: 3            Anesthesia Type: general    Vitals Value Taken Time   /80 05/17/24 2339   Temp 36.1 °C (97 °F) 05/17/24 2339   Pulse 81 05/17/24 2341   Resp 17 05/17/24 2341   SpO2 96 % 05/17/24 2341   Vitals shown include unfiled device data.    Anesthesia Post Evaluation    Patient location during evaluation: PACU  Patient participation: complete - patient cannot participate  Level of consciousness: responsive to physical stimuli and responsive to painful stimuli  Pain management: satisfactory to patient  Airway patency: patent  Cardiovascular status: acceptable  Respiratory status: acceptable  Hydration status: euvolemic  Postoperative Nausea and Vomiting: none        No notable events documented.

## 2024-05-18 NOTE — BRIEF OP NOTE
Date: 2024  OR Location: [facility] OR    Name: Chuck Dunn, : 1980, Age: 43 y.o., MRN: [id number], Sex: male    Diagnosis  * No Diagnosis Codes entered * * No Diagnosis Codes entered *     Procedures  Appendectomy Laparoscopy  47061 - [place] LAPAROSCOPIC APPENDECTOMY      Surgeons      * Curtis Salas - Primary    Resident/Fellow/Other Assistant:  Surgeons and Role:  * No surgeons found with a matching role *    Procedure Summary  Anesthesia: General  ASA: III  Anesthesia Staff: Anesthesiologist: Alonso Blue MD  C-AA: DELMI Woodward  Estimated Blood Loss: 50 mL  Intra-op Medications:   Administrations occurring from 2200 to 2305 on 24:   Medication Name Total Dose   sodium chloride 0.9 % irrigation solution 1,000 mL   heparin (porcine) injection 5,000 Units Cannot be calculated              Anesthesia Record               Intraprocedure I/O Totals       None           Specimen:   ID Type Source Tests Collected by Time   1 : appendix Tissue APPENDIX SURGICAL PATHOLOGY EXAM Curtis Salas MD 2024 [id number]        Staff:   Circulator: Inna Wasserman RN  Scrub Person: Levon Patel          Findings: Acute appendicitis    Complications:  None; patient tolerated the procedure well.     Disposition: PACU - hemodynamically stable.  Condition: stable  Specimens Collected:   ID Type Source Tests Collected by Time   1 : appendix Tissue APPENDIX SURGICAL PATHOLOGY EXAM Curtis Salas MD 2024     Attending Attestation: I performed the procedure.    Curtis Salas  Phone Number: [phone]

## 2024-05-18 NOTE — PROGRESS NOTES
"Chuck Dunn  [id number]   1980       Subjective/        Today is the first morning after laparoscopic appendectomy for acute appendicitis last night    Patient comfortable.  Up in chair.  Tolerating liquids.  Voiding spontaneously                Heart Rate:  [78-94]   Temp:  [35.9 °C (96.6 °F)-37.2 °C (98.9 °F)]   Resp:  [16-20]   BP: (123-155)/(74-95)   Height:  [180.3 cm (5' 11\")]   Weight:  [149 kg (328 lb)]   SpO2:  [94 %-99 %]     Intake/Output Summary (Last 24 hours) at 5/18/2024 1024  Last data filed at 5/18/2024 0730  Gross per 24 hour   Intake 2044 ml   Output 700 ml   Net 1344 ml          Physical Exam  Abdominal:      Comments: Soft and nontender    Dressings intact                    Results for orders placed or performed during the hospital encounter of 05/17/24 (from the past 24 hour(s))   CBC and Auto Differential   Result Value Ref Range    WBC 13.0 (H) 4.4 - 11.3 x10*3/uL    nRBC 0.0 0.0 - 0.0 /100 WBCs    RBC 4.85 4.50 - 5.90 x10*6/uL    Hemoglobin 14.4 13.5 - 17.5 g/dL    Hematocrit 43.7 41.0 - 52.0 %    MCV 90 80 - 100 fL    MCH 29.7 26.0 - 34.0 pg    MCHC 33.0 32.0 - 36.0 g/dL    RDW 13.2 11.5 - 14.5 %    Platelets 281 150 - 450 x10*3/uL    Neutrophils % 76.2 40.0 - 80.0 %    Immature Granulocytes %, Automated 0.4 0.0 - 0.9 %    Lymphocytes % 15.5 13.0 - 44.0 %    Monocytes % 6.7 2.0 - 10.0 %    Eosinophils % 0.8 0.0 - 6.0 %    Basophils % 0.4 0.0 - 2.0 %    Neutrophils Absolute 9.90 (H) 1.20 - 7.70 x10*3/uL    Immature Granulocytes Absolute, Automated 0.05 0.00 - 0.70 x10*3/uL    Lymphocytes Absolute 2.01 1.20 - 4.80 x10*3/uL    Monocytes Absolute 0.87 0.10 - 1.00 x10*3/uL    Eosinophils Absolute 0.10 0.00 - 0.70 x10*3/uL    Basophils Absolute 0.05 0.00 - 0.10 x10*3/uL   Comprehensive metabolic panel   Result Value Ref Range    Glucose 105 (H) 74 - 99 mg/dL    Sodium 138 136 - 145 mmol/L    Potassium 3.5 3.5 - 5.3 mmol/L    Chloride 101 98 - 107 mmol/L    Bicarbonate 29 21 - 32 " mmol/L    Anion Gap 12 10 - 20 mmol/L    Urea Nitrogen 13 6 - 23 mg/dL    Creatinine 0.81 0.50 - 1.30 mg/dL    eGFR >90 >60 mL/min/1.73m*2    Calcium 9.5 8.6 - 10.3 mg/dL    Albumin 4.5 3.4 - 5.0 g/dL    Alkaline Phosphatase 39 33 - 120 U/L    Total Protein 7.1 6.4 - 8.2 g/dL    AST 18 9 - 39 U/L    Bilirubin, Total 0.5 0.0 - 1.2 mg/dL    ALT 37 10 - 52 U/L   Lipase   Result Value Ref Range    Lipase 17 9 - 82 U/L   Urinalysis with Reflex Microscopic   Result Value Ref Range    Color, Urine Yellow Light-Yellow, Yellow, Dark-Yellow    Appearance, Urine Clear Clear    Specific Gravity, Urine 1.030 1.005 - 1.035    pH, Urine 5.5 5.0, 5.5, 6.0, 6.5, 7.0, 7.5, 8.0    Protein, Urine 10 (TRACE) NEGATIVE, 10 (TRACE), 20 (TRACE) mg/dL    Glucose, Urine Normal Normal mg/dL    Blood, Urine NEGATIVE NEGATIVE    Ketones, Urine NEGATIVE NEGATIVE mg/dL    Bilirubin, Urine NEGATIVE NEGATIVE    Urobilinogen, Urine 2 (1+) (A) Normal mg/dL    Nitrite, Urine NEGATIVE NEGATIVE    Leukocyte Esterase, Urine 25 Ro/µL (A) NEGATIVE   Urine Gray Tube   Result Value Ref Range    Extra Tube Hold for add-ons.    Microscopic Only, Urine   Result Value Ref Range    WBC, Urine 1-5 1-5, NONE /HPF    RBC, Urine NONE NONE, 1-2, 3-5 /HPF    Mucus, Urine 2+ Reference range not established. /LPF   CBC   Result Value Ref Range    WBC 13.6 (H) 4.4 - 11.3 x10*3/uL    nRBC 0.0 0.0 - 0.0 /100 WBCs    RBC 4.51 4.50 - 5.90 x10*6/uL    Hemoglobin 13.3 (L) 13.5 - 17.5 g/dL    Hematocrit 41.0 41.0 - 52.0 %    MCV 91 80 - 100 fL    MCH 29.5 26.0 - 34.0 pg    MCHC 32.4 32.0 - 36.0 g/dL    RDW 13.4 11.5 - 14.5 %    Platelets 257 150 - 450 x10*3/uL   Basic metabolic panel   Result Value Ref Range    Glucose 138 (H) 74 - 99 mg/dL    Sodium 135 (L) 136 - 145 mmol/L    Potassium 4.0 3.5 - 5.3 mmol/L    Chloride 101 98 - 107 mmol/L    Bicarbonate 27 21 - 32 mmol/L    Anion Gap 11 10 - 20 mmol/L    Urea Nitrogen 14 6 - 23 mg/dL    Creatinine 0.73 0.50 - 1.30 mg/dL    eGFR  >90 >60 mL/min/1.73m*2    Calcium 9.1 8.6 - 10.3 mg/dL        I reviewed the above studies      CT abdomen pelvis w IV contrast    Result Date: 5/17/2024  Interpreted By:  Rey Salazar, STUDY: CT ABDOMEN PELVIS W IV CONTRAST;  5/17/2024 6:24 pm   INDICATION: Signs/Symptoms:RLQ pain.   COMPARISON: None.   ACCESSION NUMBER(S): [id number]   ORDERING CLINICIAN: [facility]   TECHNIQUE: Contiguous axial images of the abdomen and pelvis were obtained after the intravenous administration of iodinated contrast. Coronal and sagittal reformatted images were reconstructed from the axial data.   FINDINGS: LOWER CHEST: No acute abnormality.     ABDOMEN/PELVIS:   ABDOMINAL WALL: No significant abnormality.   LIVER: No significant parenchymal abnormality.   BILE DUCTS: No significant intrahepatic or extrahepatic dilatation.   GALLBLADDER: No significant abnormality.   PANCREAS: No significant abnormality.   SPLEEN: No significant abnormality.   ADRENALS: There is a 0.9 cm low-density right adrenal nodule statistically representing an adenoma. Normal left adrenal gland.   KIDNEYS, URETERS, BLADDER: Simple left midpole renal cyst measuring 1.5 cm. Otherwise, the kidneys and bladder appear unremarkable.   REPRODUCTIVE ORGANS: No significant abnormality.   VESSELS: No significant abnormality.   RETROPERITONEUM/LYMPH NODES: No enlarged lymph nodes. No acute retroperitoneal abnormality.   BOWEL/MESENTERY/PERITONEUM: Status post gastric sleeve with small hiatal hernia noted. The appendix is dilated, inflamed, and there is periappendiceal fat stranding and fluid consistent with acute appendicitis. The large and small bowel are noninflamed. There is no bowel obstruction. There are few noninflamed colonic diverticula.   No ascites, free air, or fluid collection.     MUSCULOSKELETAL: No acute osseous abnormality. No suspicious osseous lesion.       1. Acute appendicitis without abscess or free air.   2. Colonic diverticulosis  without evidence of acute diverticulitis.         MACRO: None.   Signed by: Rey Salazar 5/17/2024 7:34 PM Dictation workstation:   [id number]       I have reviewed the images and the reports        Assessment/    Stable postop      Plan/    Discharge to home      Curtis Salas MD

## 2024-05-18 NOTE — ANESTHESIA PROCEDURE NOTES
Airway  Date/Time: 5/17/2024 10:33 PM  Urgency: elective    Airway not difficult    Staffing  Performed: DELMI   Authorized by: Alonso Blue MD    Performed by: DELMI Woodward  Patient location during procedure: OR    Indications and Patient Condition  Indications for airway management: anesthesia  Spontaneous ventilation: present  Sedation level: deep  Preoxygenated: yes  Patient position: sniffing  Mask difficulty assessment: 1 - vent by mask    Final Airway Details  Final airway type: endotracheal airway      Successful airway: ETT     Successful intubation technique: video laryngoscopy (WATKINS 4)  Facilitating devices/methods: intubating stylet  Blade: Eladia  Blade size: #4  ETT size (mm): 7.5  Placement verified by: chest auscultation and capnometry   Measured from: gums  ETT to gums (cm): 24  Number of attempts at approach: 1

## 2024-05-18 NOTE — ANESTHESIA PREPROCEDURE EVALUATION
Patient: Chuck Dunn    Procedure Information       Date/Time: 05/17/24 2200    Procedure: Appendectomy Laparoscopy    Location: [facility] / [facility]    Surgeons: Curtis Salas MD            Relevant Problems   Cardiac   (+) HTN (hypertension)      Pulmonary   (+) ROSA ELENA (obstructive sleep apnea)      Neuro   (+) Moderate episode of recurrent major depressive disorder (Multi)      GI   (+) GERD (gastroesophageal reflux disease)      Endocrine   (+) Class 3 severe obesity due to excess calories without serious comorbidity with body mass index (BMI) of 45.0 to 49.9 in adult (Multi)       Clinical information reviewed:   Tobacco  Allergies  Meds   Med Hx  Surg Hx   Fam Hx  Soc Hx        NPO Detail:  No data recorded     Physical Exam    Airway  Mallampati: II  TM distance: >3 FB  Neck ROM: full     Cardiovascular - normal exam     Dental - normal exam     Pulmonary - normal exam     Abdominal - normal exam           Vitals:    05/17/24 2124   BP: 123/87   Pulse: 94   Resp: 17   Temp: 37.2 °C (98.9 °F)   SpO2: 97%       Past Surgical History:   Procedure Laterality Date    GASTRIC RESTRICTION SURGERY      HERNIA REPAIR Left      Past Medical History:   Diagnosis Date    Anxiety and depression     Arthritis     Diverticulosis     Gastroesophageal reflux disease     Hiatal hernia     Hypertension     Morbid obesity with BMI of 45.0-49.9, adult (Multi)     Obstructive sleep apnea     Paresthesia of left lower extremity     Renal cyst, left        Current Facility-Administered Medications:     lactated Ringer's bolus 1,000 mL, 1,000 mL, intravenous, Once, Monae Alatorre MD    metroNIDAZOLE (Flagyl) 500 mg in NaCl (iso-os) 100 mL, 500 mg, intravenous, Once, Monae Alatorre MD    Current Outpatient Medications:     CALCIUM-MAG OXIDE-VITAMIN D3 ORAL, Take 1 tablet by mouth 2 times a day., Disp: , Rfl:     losartan-hydrochlorothiazide (Hyzaar) 100-12.5 mg tablet, Take 1 tablet by mouth once daily., Disp: 90  tablet, Rfl: 1    multivitamin tablet, Take 1 tablet by mouth once daily., Disp: , Rfl:     omega 3-dha-epa-fish oil (Fish OiL) 1,000 mg (120 mg-180 mg) capsule, Take 1 capsule (1,000 mg) by mouth once daily in the evening., Disp: , Rfl:     omeprazole OTC (PriLOSEC OTC) 20 mg EC tablet, Take 1 tablet (20 mg) by mouth 2 times a day before meals. Do not crush, chew, or split., Disp: , Rfl:     tirzepatide, weight loss, (Zepbound) 2.5 mg/0.5 mL injection, Inject 2.5 mg under the skin every 7 days. (Patient not taking: Reported on 5/17/2024), Disp: 4 each, Rfl: 2  Prior to Admission medications    Medication Sig Start Date End Date Taking? Authorizing Provider   CALCIUM-MAG OXIDE-VITAMIN D3 ORAL Take 1 tablet by mouth 2 times a day.   Yes Historical Provider, MD   losartan-hydrochlorothiazide (Hyzaar) 100-12.5 mg tablet Take 1 tablet by mouth once daily. 1/15/24 7/13/24 Yes Thania Elmore MD   multivitamin tablet Take 1 tablet by mouth once daily.   Yes Historical Provider, MD   omega 3-dha-epa-fish oil (Fish OiL) 1,000 mg (120 mg-180 mg) capsule Take 1 capsule (1,000 mg) by mouth once daily in the evening.   Yes Historical Provider, MD   omeprazole OTC (PriLOSEC OTC) 20 mg EC tablet Take 1 tablet (20 mg) by mouth 2 times a day before meals. Do not crush, chew, or split.   Yes Historical Provider, MD   tirzepatide, weight loss, (Zepbound) 2.5 mg/0.5 mL injection Inject 2.5 mg under the skin every 7 days.  Patient not taking: Reported on 5/17/2024 5/3/24   Willy Calzada,    calcium carbonate (Oscal) 500 mg calcium (1,250 mg) tablet Take 2 tablets (2,500 mg) by mouth once daily.  5/17/24  Historical Provider, MD   cartilage/collagen/hyalur ac/C (JOINT HEALTH ULTRA ORAL) Take by mouth.  5/17/24  Historical Provider, MD   cholecalciferol (Vitamin D3) 5,000 Units tablet Take 1 tablet (5,000 Units) by mouth once daily.  5/17/24  Historical Provider, MD   phytosterol/pantethine (CHOLESTOFF COMPLETE ORAL) Take by  "mouth 2 times a day.  5/17/24  Historical Provider, MD   vitamin E 450 mg (1000 unit) capsule Take by mouth once daily.  5/17/24  Historical Provider, MD     Allergies   Allergen Reactions    Bee Venom Protein (Honey Bee) Swelling    Venom-Wasp Swelling     Social History     Tobacco Use    Smoking status: Every Day     Current packs/day: 0.25     Average packs/day: 0.3 packs/day for 15.4 years (3.8 ttl pk-yrs)     Types: Cigarettes     Start date: 2009    Smokeless tobacco: Never   Substance Use Topics    Alcohol use: Yes     Alcohol/week: 4.0 standard drinks of alcohol     Types: 2 Glasses of wine, 2 Shots of liquor per week     Comment: every other day         Chemistry    Lab Results   Component Value Date/Time     05/17/2024 1654    K 3.5 05/17/2024 1654     05/17/2024 1654    CO2 29 05/17/2024 1654    BUN 13 05/17/2024 1654    CREATININE 0.81 05/17/2024 1654    Lab Results   Component Value Date/Time    CALCIUM 9.5 05/17/2024 1654    ALKPHOS 39 05/17/2024 1654    AST 18 05/17/2024 1654    ALT 37 05/17/2024 1654    BILITOT 0.5 05/17/2024 1654          Lab Results   Component Value Date/Time    WBC 13.0 (H) 05/17/2024 1654    HGB 14.4 05/17/2024 1654    HCT 43.7 05/17/2024 1654     05/17/2024 1654     No results found for: \"PROTIME\", \"PTT\", \"INR\"  No results found for this or any previous visit (from the past 4464 hour(s)).   Anesthesia Plan    History of general anesthesia?: yes  History of complications of general anesthesia?: no    ASA 3     general     intravenous induction   Anesthetic plan and risks discussed with patient.    Plan discussed with CAA.      "

## 2024-05-18 NOTE — CARE PLAN
The patient's goals for the shift include      The clinical goals for the shift include pain control, to sit up in the chair, ambulate in the halls & d/c home      11:15 pt discharge completed, IV removed. Pt & pt's wife voiced understanding. Pt medicated with pain pill before he left. Wife has prescription & will  drop it off at the pharmacy. Pt voids adequate amount, & vital signs are stable, SEE FLOWSHEET     Awaiting wheelchair ride down to the car

## 2024-05-18 NOTE — CARE PLAN
The clinical goals for the shift include pain control and HDS    Over the shift, the patient did make progress toward the following goals.       Problem: Pain  Goal: My pain/discomfort is manageable  Outcome: Progressing     Problem: Safety  Goal: Patient will be injury free during hospitalization  Outcome: Progressing  Goal: I will remain free of falls  Outcome: Progressing     Problem: Daily Care  Goal: Daily care needs are met  Outcome: Progressing     Problem: Psychosocial Needs  Goal: Demonstrates ability to cope with hospitalization/illness  Outcome: Progressing  Goal: Collaborate with me, my family, and caregiver to identify my specific goals  Outcome: Progressing  Flowsheets (Taken 5/18/2024 0241)  Cultural Requests During Hospitalization: n/a  Spiritual Requests During Hospitalization: n/a     Problem: Discharge Barriers  Goal: My discharge needs are met  Outcome: Progressing

## 2024-05-18 NOTE — H&P
Chuck Dunn   [id number]   1980         Chief Complaint/      Appendicitis            HPI/    43-year-old male seen at the request of emergency room staff for abdominal pain and possible appendicitis    Patient present emergency room today complaining of abdominal discomfort.'s been going on for about 24 to 36 hours.    Patient noted onset of right lower quadrant discomfort last night.  It progressed overnight would not clear during the day.  During the day his appetite has been decreased.  Continues to pass flatus.  Sexually had little bit of loose stool    Denies any hematuria dysuria    Patient has persistent pain in his right lower quadrant.  Its aggravated by movement    Patient was seen in the emergency room noted of a tender right lower quadrant.  This led to a CAT scan which suggest appendicitis    Past Medical History:   Diagnosis Date    Anxiety and depression     Arthritis     Diverticulosis     Gastroesophageal reflux disease     Hiatal hernia     Hypertension     Morbid obesity with BMI of 45.0-49.9, adult (Multi)     Obstructive sleep apnea     Paresthesia of left lower extremity     Renal cyst, left       Morbid obesity  Hypertension  Elevated lipids    Sleep apnea    Status post gastric sleeve 2017    Status post open repair of left inguinal hernia fall, 2023    GERD      Social History     Socioeconomic History    Marital status:      Spouse name: Not on file    Number of children: Not on file    Years of education: Not on file    Highest education level: Not on file   Occupational History    Not on file   Tobacco Use    Smoking status: Every Day     Current packs/day: 0.25     Average packs/day: 0.3 packs/day for 15.4 years (3.8 ttl pk-yrs)     Types: Cigarettes     Start date: 2009    Smokeless tobacco: Never   Vaping Use    Vaping status: Never Used   Substance and Sexual Activity    Alcohol use: Yes     Alcohol/week: 4.0 standard drinks of alcohol     Types: 2 Glasses of wine,  2 Shots of liquor per week     Comment: every other day    Drug use: Never    Sexual activity: Yes   Other Topics Concern    Not on file   Social History Narrative    Not on file     Social Determinants of Health     Financial Resource Strain: Not on file   Food Insecurity: Not on file   Transportation Needs: Not on file   Physical Activity: Not on file   Stress: Not on file   Social Connections: Not on file   Intimate Partner Violence: Not on file   Housing Stability: Not on file          Family History   Problem Relation Name Age of Onset    Heart disease Mother      Diabetes Mother          Review of Systems   All other systems reviewed and are negative.         Current Facility-Administered Medications:     lactated Ringer's bolus 1,000 mL, 1,000 mL, intravenous, Once, Monae Alatorre MD    metroNIDAZOLE (Flagyl) 500 mg in NaCl (iso-os) 100 mL, 500 mg, intravenous, Once, Monae Alatorre MD    Current Outpatient Medications:     CALCIUM-MAG OXIDE-VITAMIN D3 ORAL, Take 1 tablet by mouth 2 times a day., Disp: , Rfl:     losartan-hydrochlorothiazide (Hyzaar) 100-12.5 mg tablet, Take 1 tablet by mouth once daily., Disp: 90 tablet, Rfl: 1    multivitamin tablet, Take 1 tablet by mouth once daily., Disp: , Rfl:     omega 3-dha-epa-fish oil (Fish OiL) 1,000 mg (120 mg-180 mg) capsule, Take 1 capsule (1,000 mg) by mouth once daily in the evening., Disp: , Rfl:     omeprazole OTC (PriLOSEC OTC) 20 mg EC tablet, Take 1 tablet (20 mg) by mouth 2 times a day before meals. Do not crush, chew, or split., Disp: , Rfl:     tirzepatide, weight loss, (Zepbound) 2.5 mg/0.5 mL injection, Inject 2.5 mg under the skin every 7 days. (Patient not taking: Reported on 5/17/2024), Disp: 4 each, Rfl: 2       no      Xaralto  no      Eliquis  no      Coumadin  no      Plavix        Allergies   Allergen Reactions    Bee Venom Protein (Honey Bee) Swelling    Venom-Wasp Swelling        CT abdomen pelvis w IV contrast  Narrative: Interpreted By:   Rey Salazar,   STUDY:  CT ABDOMEN PELVIS W IV CONTRAST;  5/17/2024 6:24 pm      INDICATION:  Signs/Symptoms:RLQ pain.      COMPARISON:  None.      ACCESSION NUMBER(S):  [id number]      ORDERING CLINICIAN:  [place]      TECHNIQUE:  Contiguous axial images of the abdomen and pelvis were obtained after  the intravenous administration of iodinated contrast. Coronal and  sagittal reformatted images were reconstructed from the axial data.      FINDINGS:  LOWER CHEST: No acute abnormality.          ABDOMEN/PELVIS:      ABDOMINAL WALL: No significant abnormality.      LIVER: No significant parenchymal abnormality.      BILE DUCTS: No significant intrahepatic or extrahepatic dilatation.      GALLBLADDER: No significant abnormality.      PANCREAS: No significant abnormality.      SPLEEN: No significant abnormality.      ADRENALS: There is a 0.9 cm low-density right adrenal nodule  statistically representing an adenoma. Normal left adrenal gland.      KIDNEYS, URETERS, BLADDER: Simple left midpole renal cyst measuring  1.5 cm. Otherwise, the kidneys and bladder appear unremarkable.      REPRODUCTIVE ORGANS: No significant abnormality.      VESSELS: No significant abnormality.      RETROPERITONEUM/LYMPH NODES: No enlarged lymph nodes. No acute  retroperitoneal abnormality.      BOWEL/MESENTERY/PERITONEUM: Status post gastric sleeve with small  hiatal hernia noted. The appendix is dilated, inflamed, and there is  periappendiceal fat stranding and fluid consistent with acute  appendicitis. The large and small bowel are noninflamed. There is no  bowel obstruction. There are few noninflamed colonic diverticula.      No ascites, free air, or fluid collection.          MUSCULOSKELETAL: No acute osseous abnormality. No suspicious osseous  lesion.      Impression: 1. Acute appendicitis without abscess or free air.      2. Colonic diverticulosis without evidence of acute diverticulitis.                  MACRO:  None.     "  Signed by: Rey Salazar 5/17/2024 7:34 PM  Dictation workstation:   [id number]       I have reviewed the images and the reports      Laboratory data:   CBC:   Lab Results   Component Value Date    WBC 13.0 (H) 05/17/2024    RBC 4.85 05/17/2024    HGB 14.4 05/17/2024    HCT 43.7 05/17/2024     05/17/2024   ]   [facility] Labs:   Lab Results   Component Value Date     05/17/2024    K 3.5 05/17/2024     05/17/2024    CO2 29 05/17/2024    BUN 13 05/17/2024    CREATININE 0.81 05/17/2024    CALCIUM 9.5 05/17/2024    PROT 7.1 05/17/2024    ALBUMIN 4.5 05/17/2024    BILITOT 0.5 05/17/2024    ALKPHOS 39 05/17/2024    AST 18 05/17/2024    ALT 37 05/17/2024          I have reviewed the above laboratory studies      /87 (BP Location: Left arm, Patient Position: Lying)   Pulse 94   Temp 37.2 °C (98.9 °F) (Tympanic)   Resp 17   Ht 1.803 m (5' 11\")   Wt 149 kg (328 lb)   SpO2 97%   BMI 45.75 kg/m²        Physical Exam  Constitutional:       Comments: Obese male in mild discomfort   HENT:      Head: Normocephalic and atraumatic.   Cardiovascular:      Rate and Rhythm: Normal rate and regular rhythm.   Pulmonary:      Effort: Pulmonary effort is normal.      Breath sounds: Normal breath sounds.   Abdominal:      Comments: Focal right lower quadrant tenderness, no guarding, no rebound    BMI greater than 40    No umbilical hernia   Musculoskeletal:         General: Normal range of motion.      Cervical back: Normal range of motion.   Skin:     General: Skin is warm.   Neurological:      General: No focal deficit present.      Mental Status: He is oriented to person, place, and time.                  Assessment/    Acute appendicitis    Baseline health issues to include morbid obesity, hypertension, elevated lipids, sleep apnea, history of bariatric surgery      Plan/    Admit, n.p.o., fluids, antibiotics analgesia    Recommend 2 OR for appendectomy.  Risk benefits indications for surgery reviewed with " the patient.  Potential bleeding infection MI PE death etc. reviewed.  The anticipated convalescence is reviewed.  Use laparoscopic technique is emphasized.  The potential need to convert to an open procedure as mentioned.    Potential of nonoperative therapy with antibiotics alone is discussed with the patient and his family.  Advantages and disadvantages of this compared to surgical appendectomy are discussed.  Patient asked to proceed with surgery.  This to be scheduled for later this evening

## 2024-05-18 NOTE — DISCHARGE SUMMARY
Discharge Diagnosis  Other acute appendicitis without perforation, with gangrene    Issues Requiring Follow-Up  Appendicitis    Test Results Pending At Discharge  Pending Labs       Order Current Status    Surgical Pathology Exam Collected (05/17/24 3358)            Hospital Course   43-year-old male presented with appendicitis    Patient taken to operating room for laparoscopic appendectomy.  Monitored overnight and discharged home on first postoperative morning    Pertinent Physical Exam At Time of Discharge  Physical Exam  Abdominal:      Comments: Postop dressings intact    Abdomen soft and nontender         Home Medications     Medication List      START taking these medications     acetaminophen-codeine 300-30 mg tablet; Commonly known as: Tylenol w/   Codeine #3; Take 1 tablet by mouth every 6 hours if needed for severe pain   (7 - 10).     CONTINUE taking these medications     CALCIUM-MAG OXIDE-VITAMIN D3 ORAL   Fish OiL 1,000 mg (120 mg-180 mg) capsule; Generic drug: omega   3-dha-epa-fish oil   losartan-hydrochlorothiazide 100-12.5 mg tablet; Commonly known as:   Hyzaar; Take 1 tablet by mouth once daily.   multivitamin tablet   omeprazole OTC 20 mg EC tablet; Commonly known as: PriLOSEC OTC     ASK your doctor about these medications     tirzepatide (weight loss) 2.5 mg/0.5 mL injection; Commonly known as:   Zepbound; Inject 2.5 mg under the skin every 7 days.       Outpatient Follow-Up  Future Appointments   Date Time Provider Department Center   5/23/2024  3:30 PM PHARMACY [facility] RESOURCE [id number] [facility]   5/26/2024 10:45 AM [place] [address] [address] 1 [id number] [facility]   6/18/2024  9:00 AM Janeth Guzman RD, LD [id number] [place]   6/24/2024  7:15 PM SLEEP LAB [facility] ROOM 1 [id number] [place]   9/6/2024  9:20 AM Willy Calzada,  [id number] [place]   Will discharge to home    Follow-up with Dr. Salas at [phone] in 10 days    Curtis Salas MD

## 2024-05-20 ENCOUNTER — PATIENT OUTREACH (OUTPATIENT)
Dept: CARE COORDINATION | Facility: CLINIC | Age: 44
End: 2024-05-20
Payer: COMMERCIAL

## 2024-05-20 ENCOUNTER — TELEPHONE (OUTPATIENT)
Dept: PHARMACY | Facility: HOSPITAL | Age: 44
End: 2024-05-20
Payer: COMMERCIAL

## 2024-05-20 NOTE — TELEPHONE ENCOUNTER
Prior Authorization - Zepbound  A prior authorization request for Chuck Dunn was submitted for Zepbound on 5/16/24.  The authorization request was approved by the patient's insurance on 05/20/24.    Key: [id number]  Approval Duration: 01/15/2025    The patient has been notified of this outcome.    Of note, patient recently had emergency appendectomy. Patient advised to wait until 2-weeks post-op to begin taking Zepbound.    Please contact clinical pharmacy with any further questions. Thank you.    Betty Caruso, PharmD  [phone]  05/20/24

## 2024-05-20 NOTE — PROGRESS NOTES
Discharge Facility: [facility]  Discharge Diagnosis: Appendicitis  Admission Date: 5/17/2024  Discharge Date: 5/18/2024    PCP Appointment Date:  -Pt declines PCP follow up at this time  -Next appt 9/6/2024 0920    Specialist Appointment Date:   -5/23/2024 1530 virtual pharmacy  -5/26/2024 1045 CT cardiac scoring  -5/30/2024 1345 surgeon  -6/18/2024 0900 virtual nutrition  -6/24/2024 1915 sleep lab    Hospital Encounter and Summary: Linked     See discharge assessment below for further details    Engagement  Call Start Time: 1032 (5/20/2024 10:32 AM)    Medications  Medications reviewed with patient/caregiver?: Yes (new prescription reviewed; acetaminophen-codeine) (5/20/2024 10:32 AM)  Is the patient having any side effects they believe may be caused by any medication additions or changes?: No (5/20/2024 10:32 AM)  Does the patient have all medications ordered at discharge?: Yes (5/20/2024 10:32 AM)  Care Management Interventions: No intervention needed (5/20/2024 10:32 AM)  Is the patient taking all medications as directed (includes completed medication regime)?: Yes (5/20/2024 10:32 AM)    Appointments  Does the patient have a primary care provider?: Yes (5/20/2024 10:32 AM)  Has the patient kept scheduled appointments due by today?: Yes (5/20/2024 10:32 AM)    Self Management  Has home health visited the patient within 72 hours of discharge?: Not applicable (5/20/2024 10:32 AM)    Patient Teaching  Does the patient have access to their discharge instructions?: Yes (5/20/2024 10:32 AM)  Care Management Interventions: Reviewed instructions with patient (5/20/2024 10:32 AM)  What is the patient's perception of their health status since discharge?: Improving (5/20/2024 10:32 AM)  Is the patient/caregiver able to teach back the hierarchy of who to call/visit for symptoms/problems? PCP, Specialist, Home Health nurse, Urgent Care, ED, 911: Yes (5/20/2024 10:32 AM)    Wrap Up  Wrap Up Additional Comments: Pt was  admitted to [facility] 5/17-5/18/2024 for appendicitis. Pt s/p 5/17/2024 appendectomy laparoscopy. Pt states he is doing well just normal aches and pains with recovery. Pt discharged with tylenol with codeine #3 and denies any questions or issues with medication. Pt reports understanding of discharge instructions. Pt has follow up with surgeon 5/30/2024 1345. Pt declines scheduling PCP follow up at this time. Verified next PCP appt 9/6/2024; pt encouraged to call if needs change. Pt denies any questions, needs, or concerns. He is encouraged to call if questions or needs arise. (5/20/2024 10:32 AM)  Call End Time: 1035 (5/20/2024 10:32 AM)

## 2024-05-23 ENCOUNTER — TELEMEDICINE (OUTPATIENT)
Dept: PHARMACY | Facility: HOSPITAL | Age: 44
End: 2024-05-23
Payer: COMMERCIAL

## 2024-05-23 DIAGNOSIS — F33.1 MODERATE EPISODE OF RECURRENT MAJOR DEPRESSIVE DISORDER (MULTI): ICD-10-CM

## 2024-05-23 DIAGNOSIS — G47.33 OSA (OBSTRUCTIVE SLEEP APNEA): ICD-10-CM

## 2024-05-23 DIAGNOSIS — E66.01 CLASS 3 SEVERE OBESITY DUE TO EXCESS CALORIES WITHOUT SERIOUS COMORBIDITY WITH BODY MASS INDEX (BMI) OF 45.0 TO 49.9 IN ADULT (MULTI): ICD-10-CM

## 2024-05-23 NOTE — PROGRESS NOTES
Patient ID: Chuck Dunn is a 43 y.o. male who presents for Obesity.  Pt is here for First appointment.     PCP/Referring Provider: Willy Calzada DO  Last Visit: 5.3.24  Next visit: 6.18.24    Verbal consent to manage patient's drug therapy was obtained from patient. They were informed they may decline to participate or withdraw from participation in pharmacy services at any time. Patient is agreeable to detailed voice messages if unable to be reached.       Subjective    Current exercise: none at this time d/t recent procedure  Current diet: tries to eat healthy, does NOT eat breakfast, lunch time varies which leads to overeating in the evening  Has worked with nutritionist/dietician? Yes, next appt 6/18    HPI  WEIGHT MANAGEMENT  BMI Readings from Last 1 Encounters:   05/18/24 45.75 kg/m²   Starting weight: 328lb     Non-pharmacological therapy  Weight loss techniques attempted:  Surgical intervention: bariatric sleeve in 2017    Pharmacological therapy  Current Medications: Zepbound 2.5mg weekly - not yet started, waiting for ok after appendectomy  Previous Medications: none     Adherence:  N/A  Adverse Effects: N/A        Review of Systems    Objective     BP Readings from Last 4 Encounters:   05/18/24 (!) 150/93   05/03/24 138/82   01/15/24 130/80   11/21/23 129/89      There were no vitals filed for this visit.     Labs  Creatinine   Date Value Ref Range Status   05/18/2024 0.73 0.50 - 1.30 mg/dL Final     eGFR   Date Value Ref Range Status   05/18/2024 >90 >60 mL/min/1.73m*2 Final     Comment:     Calculations of estimated GFR are performed using the 2021 CKD-EPI Study Refit equation without the race variable for the IDMS-Traceable creatinine methods.  https://jasn.asnjournals.org/content/early/2021/09/22/ASN.0070780413     Sodium   Date Value Ref Range Status   05/18/2024 135 (L) 136 - 145 mmol/L Final     Potassium   Date Value Ref Range Status   05/18/2024 4.0 3.5 - 5.3 mmol/L Final      Chloride   Date Value Ref Range Status   05/18/2024 101 98 - 107 mmol/L Final     Urea Nitrogen   Date Value Ref Range Status   05/18/2024 14 6 - 23 mg/dL Final     AST   Date Value Ref Range Status   05/17/2024 18 9 - 39 U/L Final        Lab Results   Component Value Date    BILITOT 0.5 05/17/2024    CALCIUM 9.1 05/18/2024    CO2 27 05/18/2024     05/18/2024    CREATININE 0.73 05/18/2024    GLUCOSE 138 (H) 05/18/2024    ALKPHOS 39 05/17/2024    K 4.0 05/18/2024    PROT 7.1 05/17/2024     (L) 05/18/2024    AST 18 05/17/2024    ALT 37 05/17/2024    BUN 14 05/18/2024    ANIONGAP 11 05/18/2024    ALBUMIN 4.5 05/17/2024    LIPASE 17 05/17/2024    GFRMALE >90 04/27/2023     Lab Results   Component Value Date    TRIG 123 03/27/2023    CHOL 202 (H) 03/27/2023    HDL 46.8 03/27/2023     Lab Results   Component Value Date    HGBA1C 5.6 10/27/2021       Current Outpatient Medications   Medication Instructions    acetaminophen-codeine (Tylenol w/ Codeine #3) 300-30 mg tablet 1 tablet, oral, Every 6 hours PRN    CALCIUM-MAG OXIDE-VITAMIN D3 ORAL 1 tablet, oral, 2 times daily    losartan-hydrochlorothiazide (Hyzaar) 100-12.5 mg tablet 1 tablet, oral, Daily    multivitamin tablet 1 tablet, oral, Daily    omega 3-dha-epa-fish oil (Fish OiL) 1,000 mg (120 mg-180 mg) capsule 1 capsule, oral, Every evening    omeprazole OTC (PRILOSEC OTC) 20 mg, oral, 2 times daily before meals, Do not crush, chew, or split.     tirzepatide (weight loss) (ZEPBOUND) 2.5 mg, subcutaneous, Every 7 days        DRUG INTERACTIONS:  None at time of review    Assessment/Plan   Patient referred for weight loss management. Has not yet started Zepbound d/t recent appendectomy - plans to start beginning of June. Reviewed MOA, administration instructions and expectations of medication. General diet guidance provided - pt will be seeing a dietician in June. No changes to regimen made today.      Labs ordered:  none     Referrals:  none      Follow-up: June 20 at 1530     Patient was provided with PharmD phone number and encouraged to reach out with any questions or concerns Prior to next appointment or ask provider for another pharmacy referral.    Time spent with pt: Total length of time 20 (minutes) of the encounter and more than 50% was spent counseling the patient.    Elli Carter PharmD, TIAGO  Clinical Pharmacist  Pharmacy Services  [phone]    Continue all meds under the continuation of care with the referring provider and clinical pharmacy team.    Verbal consent to manage patient's drug therapy was obtained from the patient. They were informed they may decline to participate or withdraw from participation in pharmacy services at any time.

## 2024-05-26 ENCOUNTER — APPOINTMENT (OUTPATIENT)
Dept: RADIOLOGY | Facility: CLINIC | Age: 44
End: 2024-05-26
Payer: COMMERCIAL

## 2024-05-30 ENCOUNTER — OFFICE VISIT (OUTPATIENT)
Dept: SURGERY | Facility: CLINIC | Age: 44
End: 2024-05-30
Payer: COMMERCIAL

## 2024-05-30 VITALS
DIASTOLIC BLOOD PRESSURE: 88 MMHG | HEART RATE: 91 BPM | OXYGEN SATURATION: 97 % | SYSTOLIC BLOOD PRESSURE: 135 MMHG | RESPIRATION RATE: 16 BRPM | TEMPERATURE: 97.9 F

## 2024-05-30 DIAGNOSIS — K35.891 OTHER ACUTE APPENDICITIS WITHOUT PERFORATION, WITH GANGRENE: Primary | ICD-10-CM

## 2024-05-30 PROCEDURE — 3008F BODY MASS INDEX DOCD: CPT | Performed by: SURGERY

## 2024-05-30 PROCEDURE — 99024 POSTOP FOLLOW-UP VISIT: CPT | Performed by: SURGERY

## 2024-05-30 NOTE — PROGRESS NOTES
Follow-Up Note:  This patient is seen postoperatively on behalf of my partner Dr. Salas who is on PTO     Chief Complaint:  Follow up from appendectomy        History of Present Illness:  This is a 43-year-old gentleman who presents for follow-up after a laparoscopic appendectomy on 5/17/2024.  Patient has been doing well since    Past Medical History:  Refer to H&P         Past Surgical History:  Refer to H&P         Medications:  Refer to H&P         Allergies:  Refer to H&P         Family History:  Refer to H&P         Social History:  Refer to H&P         Review of Systems  A complete 10 point review of systems was performed and is negative except as noted in the history of present illness.     Physical Exam: [ ]  General: No acute distress. Sitting up in bed.   Neuro: Alert and oriented ×3. Follows commands.  Head: Atraumatic  Eyes: Pupils equal reactive to light. Extraocular motions intact.  Ears: Hears normal speaking voice.  Mouth, Nose, Throat: Mucous membranes moist.  Normal dentition.  Neck: Supple. No appreciable masses.  Chest: No crepitus.    Heart: Regular rate and rhythm.  Lung: Clear to auscultation bilaterally.  Vascular: No carotid bruits.  Palpable radial pulses bilaterally.  Abdomen: Soft. Nondistended. Nontender.  Healing laparoscopic port sites. Staples in place.  Musculoskeletal: Moves all extremities.  Normal range of motion.  Lymphatic: No palpable lymph nodes.  Skin: No rashes or lesions.  Psychological: Normal affect           Labs:  Pathology: Pathology has not yet resulted          Imaging:   CT from 5/17/2024 shows acute appendicitis without abscess or free air.     Assessment:  This is a 43-year-old male who presents for follow up of a laparoscopic appendectomy for acute appendicitis.  He has been doing very well since surgery.  He has no complaints.  There is no evidence of any obvious complications.  Staples were removed.  Again a conversation of smoking cessation was had with the  pt and he is starting to quit he says.         Plan:  -- OK to swim in 2wks.  -- No lifting anything heavier than 20 pounds for another 4 weeks, which is a totoal of 6 weeks postoperative.  -- At this point, there is no specific need to follow up with me.  If he has any new questions or concerns, he may return at any time.        Margarita Maurice MD MPH  General Surgery  Office: [phone]  Fax: [phone]

## 2024-06-17 PROBLEM — Z00.00 WELL ADULT HEALTH CHECK: Status: ACTIVE | Noted: 2024-06-17

## 2024-06-17 NOTE — PROGRESS NOTES
Reason for Nutrition Visit:  Pt is a 43 y.o. male being seen virtually referred for   1. Class 3 severe obesity due to excess calories without serious comorbidity with body mass index (BMI) of 45.0 to 49.9 in adult (Multi)        2. Well adult health check           Past Medical Hx:  Patient Active Problem List   Diagnosis    Class 3 severe obesity due to excess calories without serious comorbidity with body mass index (BMI) of 45.0 to 49.9 in adult (Multi)    Other acute appendicitis without perforation, with gangrene    Well adult health check      Nutrition Assessment    Food & Nutrition Related History:  He had a gastric sleeve in 2017 and lost significant amount of weight. His weight increased in the past 4 years.   His job changed, he is traveling remote now instead of reporting to the same site. When he stays at a [facility] he gets groceries so he can cook in their kitchen instead of dining out.   He is interested in following a ketogenic diet - is drawn to it because of potential mental health advantages and also to help with weight loss. He is being relatively liberal about it, not counting precise grams of CHO or testing for ketones. His wife got some ketogenic diet cookbooks.    Lives with wife and 2 children (8 and 15yo), and his oldest child recently moved out.     Food Allergies: None  Intolerance: artificial sweeteners (Stevia, Sucralose)  Appetite: Normal  GI Symptoms : reflux, managed with Prilosec. Denies GI side effects from Zepbound aside from eating Mexican food shortly after his first dose.   Swallowing Difficulty: No problems with swallowing  Chewing no problems chewing  Food Preparation: Patient and Partner/Spouse  Cooking Skills/Barriers: None reported  Grocery Shopping: Patient and Partner/Spouse  No difficulty affording food  Supplements: Morning: Multivitamin, Calcium, and Fish Oil, magnesium, CholestOff plus, CoQ10 (300 mg), Vitamin D3, Vitamin E (180 mg), green tea (400 mg). Mid day:  [facility], Focus Factor, 65 mg iron, probiotic, vitamin C with rosehips (1,000 mg). Evening: calcium, magnesium, fish oil, CholestOff plus.     Dietary Recall:   He changed his eating habits about 2 weeks ago. Prior to the change:   Meal 1:   - wasn't eating breakfast, just coffee with cream and Nutri-Sweet  Meal 2: 11 am to 3 pm  - if he was at a work site, suppliers would offer to provide him with lunch, such as pizza, or he got soup, breadstick & salad at [employer]  - if he was at home, he would eat leftovers, or nuts/fruit/cheese/deli meat  Meal 3: Dinner  - often prepares Asian food at home  - or spaghetti  Snack:   - especially if he skipped lunch or didn't have a regular food routine during the day, he would eat an evening snack    In the past 2 weeks he has been eating differently. Since changing his food intake he has been eating a high protein diet to be similar to a ketogenic / carnivore diet:  Meal 1:   - has started to eat in the morning, such as avocado & eggs or ham & eggs  - last week made a [employer] breakfast salad with smoked salmon, shredded cheese.   Meal 2: lunch  - has been speaking up and asking the work site staff to allow him to eat earlier and telling them he has dietary restrictions (unwich from [employer], or a caprese salad with a bowl of soup from an Italian restaurant). Or he goes out to get his own food. Hasn't been eating pizza anymore.   Meal 3:   - home-cooked meals, emphasis on protein food and some vegetables  Has been fasting after 8 pm    Alcohol Intake: alcohol intake changed during the pandemic. In the past 2 weeks has been limiting to vodka + flavored Jeanine water    Physical Activity: used to walk for exercise, but schedule at work/travel interferes with ability to walk. He would like to get back into a habit of walking in the neighborhood, and do some calisthenics at home or in hotel room.     Labs:  Lab Results   Component Value Date    HGBA1C 5.6  "10/27/2021     (L) 05/18/2024    K 4.0 05/18/2024     05/18/2024    CO2 27 05/18/2024    BUN 14 05/18/2024    CREATININE 0.73 05/18/2024    CALCIUM 9.1 05/18/2024    ALBUMIN 4.5 05/17/2024    PROT 7.1 05/17/2024    BILITOT 0.5 05/17/2024    ALKPHOS 39 05/17/2024    ALT 37 05/17/2024    AST 18 05/17/2024    GLUCOSE 138 (H) 05/18/2024     Lab Results   Component Value Date    CHOL 202 (H) 03/27/2023    TRIG 123 03/27/2023    HDL 46.8 03/27/2023    LDLF 131 (H) 03/27/2023      Vitamin B12: 452 in 2021  Vitamin D: 50 (2022), 28 (2021)    Nutrition Focused Physical Exam:    Performed/Deferred: Deferred due to be being virtual visit    Anthropometrics:  Ht Readings from Last 1 Encounters:   06/18/24 1.803 m (5' 11\")     BMI Readings from Last 1 Encounters:   06/18/24 43.47 kg/m²     Wt Readings from Last 10 Encounters:   06/18/24 141 kg (311 lb 11.2 oz)   05/18/24 149 kg (328 lb)   05/03/24 147 kg (323 lb 3.2 oz)   01/15/24 144 kg (318 lb)   11/08/23 143 kg (315 lb)   10/23/23 143 kg (315 lb 7.7 oz)   09/14/23 147 kg (324 lb 4 oz)   09/05/23 147 kg (323 lb 12.8 oz)   07/14/23 141 kg (311 lb 6.4 oz)   06/12/23 145 kg (319 lb 3.2 oz)   At home: 311.7# today    Weight change: about 10# weight gain in the past year, followed by 13# weight loss in the past 2 weeks (lost about 4% in 2 weeks)  Significant weight change: Yes    Estimated Nutrition Needs:    Total Energy Estimated Needs (kCal): 2303 kCal   Method for Estimating Needs: REE x 1.2 - 500 kcal to promote weight loss   Total Protein Estimated Needs (g): 113.35 g   Total Protein Estimated Needs (g/kg): 0.8 g/kg    Nutrition Diagnosis     Patient has Malnutrition Diagnosis: No          Patient has Nutrition Diagnosis: Yes Diagnosis Status (1): New  Nutrition Diagnosis 1: Overweight Related to (1): history of being in a larger body prior to gastric sleeve As Evidenced by (1): BMI presently 43     Diagnosis Status (2): New Nutrition Diagnosis 2: Physicial " inactivity  Nutrition Diagnosis 2: Physicial inactivity Related to (2): work routine has interrupted his exercise routine As Evidenced by (2): currently he is not exercising recommended amount for heart-health and weight management (150 minutes/week)     Nutrition Interventions/Recommendations   Nutrition education on the following diet topic(s): Decreased Energy, Decreased Saturated Fat, Heart-Healthy Types of Fat, Increased Protein, and Physical Activity    Nutrition counseling using the following strategy: Nutrition Counseling: Motivational Interviewing    Coordination of Care: Referring Provider to ask to add on Vitamin D, Vitamin B12 and uric acid level    Education:  Discussed his interest in the ketogenic diet.   - Talked about heart-healthy fats and antioxidant-rich foods to include in his diet pattern while he is limiting carbohydrates and eating a diet richer in protein and fat. Encouraged him to include an abundance of vegetables at his meals, explained if he feels concerned they add too many carbohydrates then he could do urinary testing for ketones. Encouraged using avocado, avocado oil, olive oil as primary fats while he is eating a higher fat diet (instead of saturated fats like animal-based fats, coconut oil). Discussed that keeping vegetables in his diet can provide a valuable source of potassium, which is a helpful nutrient for heart health / blood pressure lowering.   - Explained that if he is achieving ketosis, it could be a risk factor for higher uric acid level and low potassium level. He could benefit from having a baseline uric acid level checked.     2. Advised he should have annual monitoring of vitamin D and vitamin B12 level to make sure supplementation is adequate since he is post-gastric sleeve.      3. Encouraged him to re-establish an exercise habit. Encouraged him to start small with a realistic goal and let it grow. Discussed walking or exercising in his hotel room as options.      Handouts: High protein foods, Mediterranean book, DASH handout (shows which foods are rich in potassium/magnesium/calcium for blood pressure lowering)    *Patient expressed understanding of the education provided and denied any additional questions/concerns.     Monitoring & Evaluation:  Monitoring & Evaluation: Amount of Food - Estimated, Types of Food, and Meal/Snack Pattern - Estimated, weight, exercise habits    Nutrition Goals:  Continue to eat at least 3 times per day instead of skipping breakfast  Continue to eat a higher protein diet rich in heart-healthy fats and including vegetables   Begin an exercise habit such as walking or doing some exercises in his hotel room a few days per week      Follow up Plan:   PRN, patient will call to schedule follow up if desired    Readiness to Change : Good  Level of Understanding : Good  Anticipated Compliant : Good

## 2024-06-18 ENCOUNTER — APPOINTMENT (OUTPATIENT)
Dept: PRIMARY CARE | Facility: CLINIC | Age: 44
End: 2024-06-18
Payer: COMMERCIAL

## 2024-06-18 VITALS — HEIGHT: 71 IN | BODY MASS INDEX: 43.64 KG/M2 | WEIGHT: 311.7 LBS

## 2024-06-18 DIAGNOSIS — Z00.00 WELL ADULT HEALTH CHECK: ICD-10-CM

## 2024-06-18 DIAGNOSIS — E66.01 CLASS 3 SEVERE OBESITY DUE TO EXCESS CALORIES WITHOUT SERIOUS COMORBIDITY WITH BODY MASS INDEX (BMI) OF 45.0 TO 49.9 IN ADULT (MULTI): Primary | ICD-10-CM

## 2024-06-18 PROCEDURE — 97802 MEDICAL NUTRITION INDIV IN: CPT | Performed by: DIETITIAN, REGISTERED

## 2024-06-18 NOTE — PATIENT INSTRUCTIONS
Discussed his interest in the ketogenic diet.   - Talked about heart-healthy fats and antioxidant-rich foods to include in his diet pattern while he is limiting carbohydrates and eating a diet richer in protein and fat. Encouraged him to include an abundance of vegetables at his meals, explained if he feels concerned they add too many carbohydrates then he could do urinary testing for ketones. Encouraged using avocado, avocado oil, olive oil as primary fats while he is eating a higher fat diet (instead of saturated fats like animal-based fats, coconut oil). Discussed that keeping vegetables in his diet can provide a valuable source of potassium, which is a helpful nutrient for heart health / blood pressure lowering.   - Explained that if he is achieving ketosis, it could be a risk factor for higher uric acid level and low potassium level. He could benefit from having a baseline uric acid level checked.     2. Advised he should have annual monitoring of vitamin D and vitamin B12 level to make sure supplementation is adequate since he is post-gastric sleeve.      3. Encouraged him to re-establish an exercise habit. Encouraged him to start small with a realistic goal and let it grow. Discussed walking or exercising in his hotel room as options.

## 2024-06-18 NOTE — Clinical Note
Hi! Chuck hasn't had monitoring of his vitamin D or vitamin B12 in a few years. Would you be willing to add serum B12 and 25-OH vitamin D to his pending labs? Also he is tending towards a ketogenic diet at this time. I am wondering if you would be willing to add a uric acid level to make sure that isn't high since ketogenic diet can make that worse.  -Becky

## 2024-06-20 ENCOUNTER — LAB (OUTPATIENT)
Dept: LAB | Facility: LAB | Age: 44
End: 2024-06-20
Payer: COMMERCIAL

## 2024-06-20 ENCOUNTER — APPOINTMENT (OUTPATIENT)
Dept: PHARMACY | Facility: HOSPITAL | Age: 44
End: 2024-06-20
Payer: COMMERCIAL

## 2024-06-20 DIAGNOSIS — I10 PRIMARY HYPERTENSION: ICD-10-CM

## 2024-06-20 DIAGNOSIS — E66.01 CLASS 3 SEVERE OBESITY DUE TO EXCESS CALORIES WITHOUT SERIOUS COMORBIDITY WITH BODY MASS INDEX (BMI) OF 45.0 TO 49.9 IN ADULT (MULTI): ICD-10-CM

## 2024-06-20 DIAGNOSIS — Z12.5 ENCOUNTER FOR PROSTATE CANCER SCREENING: ICD-10-CM

## 2024-06-20 DIAGNOSIS — Z00.00 WELL ADULT HEALTH CHECK: ICD-10-CM

## 2024-06-20 LAB
ALBUMIN SERPL BCP-MCNC: 4.8 G/DL (ref 3.4–5)
ALP SERPL-CCNC: 40 U/L (ref 33–120)
ALT SERPL W P-5'-P-CCNC: 39 U/L (ref 10–52)
ANION GAP SERPL CALC-SCNC: 13 MMOL/L (ref 10–20)
APPEARANCE UR: CLEAR
AST SERPL W P-5'-P-CCNC: 25 U/L (ref 9–39)
BILIRUB SERPL-MCNC: 0.6 MG/DL (ref 0–1.2)
BILIRUB UR STRIP.AUTO-MCNC: NEGATIVE MG/DL
BUN SERPL-MCNC: 14 MG/DL (ref 6–23)
CALCIUM SERPL-MCNC: 9.9 MG/DL (ref 8.6–10.3)
CHLORIDE SERPL-SCNC: 102 MMOL/L (ref 98–107)
CHOLEST SERPL-MCNC: 204 MG/DL (ref 0–199)
CHOLESTEROL/HDL RATIO: 4.4
CO2 SERPL-SCNC: 28 MMOL/L (ref 21–32)
COLOR UR: YELLOW
CREAT SERPL-MCNC: 0.87 MG/DL (ref 0.5–1.3)
CREAT UR-MCNC: 411.1 MG/DL (ref 20–370)
EGFRCR SERPLBLD CKD-EPI 2021: >90 ML/MIN/1.73M*2
ERYTHROCYTE [DISTWIDTH] IN BLOOD BY AUTOMATED COUNT: 13.2 % (ref 11.5–14.5)
GLUCOSE SERPL-MCNC: 84 MG/DL (ref 74–99)
GLUCOSE UR STRIP.AUTO-MCNC: NORMAL MG/DL
HCT VFR BLD AUTO: 44.8 % (ref 41–52)
HCV AB SER QL: NONREACTIVE
HDLC SERPL-MCNC: 46.3 MG/DL
HGB BLD-MCNC: 14.7 G/DL (ref 13.5–17.5)
HIV 1+2 AB+HIV1 P24 AG SERPL QL IA: NONREACTIVE
HYALINE CASTS #/AREA URNS AUTO: ABNORMAL /LPF
KETONES UR STRIP.AUTO-MCNC: ABNORMAL MG/DL
LDLC SERPL CALC-MCNC: 141 MG/DL
LEUKOCYTE ESTERASE UR QL STRIP.AUTO: NEGATIVE
MCH RBC QN AUTO: 29.5 PG (ref 26–34)
MCHC RBC AUTO-ENTMCNC: 32.8 G/DL (ref 32–36)
MCV RBC AUTO: 90 FL (ref 80–100)
MICROALBUMIN UR-MCNC: 30.2 MG/L
MICROALBUMIN/CREAT UR: 7.3 UG/MG CREAT
MUCOUS THREADS #/AREA URNS AUTO: ABNORMAL /LPF
NITRITE UR QL STRIP.AUTO: NEGATIVE
NON HDL CHOLESTEROL: 158 MG/DL (ref 0–149)
NRBC BLD-RTO: 0 /100 WBCS (ref 0–0)
PH UR STRIP.AUTO: 6 [PH]
PLATELET # BLD AUTO: 256 X10*3/UL (ref 150–450)
POTASSIUM SERPL-SCNC: 4.4 MMOL/L (ref 3.5–5.3)
PROT SERPL-MCNC: 7.1 G/DL (ref 6.4–8.2)
PROT UR STRIP.AUTO-MCNC: ABNORMAL MG/DL
PSA SERPL-MCNC: 0.7 NG/ML
RBC # BLD AUTO: 4.99 X10*6/UL (ref 4.5–5.9)
RBC # UR STRIP.AUTO: NEGATIVE /UL
RBC #/AREA URNS AUTO: ABNORMAL /HPF
SODIUM SERPL-SCNC: 139 MMOL/L (ref 136–145)
SP GR UR STRIP.AUTO: 1.03
TRIGL SERPL-MCNC: 86 MG/DL (ref 0–149)
TSH SERPL-ACNC: 1.65 MIU/L (ref 0.44–3.98)
UROBILINOGEN UR STRIP.AUTO-MCNC: NORMAL MG/DL
VLDL: 17 MG/DL (ref 0–40)
WBC # BLD AUTO: 7.5 X10*3/UL (ref 4.4–11.3)
WBC #/AREA URNS AUTO: ABNORMAL /HPF

## 2024-06-20 PROCEDURE — 84443 ASSAY THYROID STIM HORMONE: CPT

## 2024-06-20 PROCEDURE — 86803 HEPATITIS C AB TEST: CPT

## 2024-06-20 PROCEDURE — 82570 ASSAY OF URINE CREATININE: CPT

## 2024-06-20 PROCEDURE — 81001 URINALYSIS AUTO W/SCOPE: CPT

## 2024-06-20 PROCEDURE — 80061 LIPID PANEL: CPT

## 2024-06-20 PROCEDURE — 80053 COMPREHEN METABOLIC PANEL: CPT

## 2024-06-20 PROCEDURE — 36415 COLL VENOUS BLD VENIPUNCTURE: CPT

## 2024-06-20 PROCEDURE — 82043 UR ALBUMIN QUANTITATIVE: CPT

## 2024-06-20 PROCEDURE — 84153 ASSAY OF PSA TOTAL: CPT

## 2024-06-20 PROCEDURE — 85027 COMPLETE CBC AUTOMATED: CPT

## 2024-06-20 PROCEDURE — 87389 HIV-1 AG W/HIV-1&-2 AB AG IA: CPT

## 2024-06-20 PROCEDURE — 80323 ALKALOIDS NOS: CPT

## 2024-06-20 RX ORDER — LOSARTAN POTASSIUM AND HYDROCHLOROTHIAZIDE 12.5; 1 MG/1; MG/1
1 TABLET ORAL DAILY
Qty: 90 TABLET | Refills: 3 | Status: SHIPPED | OUTPATIENT
Start: 2024-06-20

## 2024-06-20 RX ORDER — TIRZEPATIDE 5 MG/.5ML
5 INJECTION, SOLUTION SUBCUTANEOUS
Qty: 2 ML | Refills: 0 | Status: SHIPPED | OUTPATIENT
Start: 2024-06-20

## 2024-06-20 NOTE — PROGRESS NOTES
[facility] Pharmacist Visit - Weight Management  Chuck Dunn is a 43 y.o. male was referred to Clinical Pharmacy Team for Obesity.    Referring Provider: Willy Calzada DO  PCP: Willy Calzada DO - last visit: 5/3/24, next visit: 9/6/24    Subjective   HPI  PMH significant for Obesity, acute appendicitis s/p appendectomy.  Special needs/barriers to therapy: None identified    WEIGHT MANAGEMENT  BMI Readings from Last 1 Encounters:   06/18/24 43.47 kg/m²   Starting weight: 323 lbs. (5/3/24)  Current weight: 311 lbs.    Lab Results   Component Value Date    HGBA1C 5.6 10/27/2021    GLUCOSE 84 06/20/2024     Lifestyle  Diet: Following with nutrition. Ketogenic diet.  Physical Activity: Trying to increase, limited by frequent travel for work    Non-pharmacological therapy  Weight loss techniques attempted:  Surgical intervention: Sleeve gastrectomy, 2017    Pharmacological therapy  Current Medications: Zepbound 2.5mg once weekly (Sundays, 3 doses completed)  Previous Medications: None     Adherence: Takes medication as directed and reports no missed doses  Adverse Effects: None      Objective   Vitals  BP Readings from Last 2 Encounters:   05/30/24 135/88   05/18/24 (!) 150/93     BMI Readings from Last 1 Encounters:   06/18/24 43.47 kg/m²      Labs  A1C  Lab Results   Component Value Date    HGBA1C 5.6 10/27/2021     BMP  Lab Results   Component Value Date    CALCIUM 9.9 06/20/2024     06/20/2024    K 4.4 06/20/2024    CO2 28 06/20/2024     06/20/2024    BUN 14 06/20/2024    CREATININE 0.87 06/20/2024    EGFR >90 06/20/2024     LFTs  Lab Results   Component Value Date    ALT 39 06/20/2024    AST 25 06/20/2024    ALKPHOS 40 06/20/2024    BILITOT 0.6 06/20/2024     FLP  Lab Results   Component Value Date    TRIG 86 06/20/2024    CHOL 204 (H) 06/20/2024    LDLF 131 (H) 03/27/2023    LDLCALC 141 (H) 06/20/2024    HDL 46.3 06/20/2024       Assessment/Plan   Problem List Items Addressed This Visit        Class 3 severe obesity due to excess calories without serious comorbidity with body mass index (BMI) of 45.0 to 49.9 in adult (Multi)     Weight Management: Current regimen includes Zepbound 2.5mg once weekly. Tolerating well. Patient's current weight reported as 311 lbs. Has lost 12 lbs (3.7% of TBW) since starting therapy plan. Due to tolerating well and indication for further weight loss, plan to continue titrating Zepbound to maximally tolerated dose.  Increase Zepbound to 5mg once weekly after completing remaining dose of 2.5mg.  Continue to focus on lifestyle modifications as discussed.         Relevant Medications    tirzepatide, weight loss, (Zepbound) 5 mg/0.5 mL injection    Other Relevant Orders    Follow Up In [facility] - Pharmacy     Patient Education:  Counseled patient on relevant MOA, expectations, side effects, duration of therapy, contraindications, administration, and monitoring parameters.  All questions and concerns addressed. Contact pharmacist with any further questions or concerns prior to next appointment.    Clinical Pharmacist follow-up: 7/18/24 at 3:30pm, Telehealth visit    Sarahy OdomD  Clinical Pharmacist  06/21/24    Continue all meds under the continuation of care with the referring provider and clinical pharmacy team.  Verbal consent to manage patient's drug therapy was obtained from the patient. They were informed they may decline to participate or withdraw from participation in pharmacy services at any time.

## 2024-06-21 NOTE — ASSESSMENT & PLAN NOTE
Weight Management: Current regimen includes Zepbound 2.5mg once weekly. Tolerating well. Patient's current weight reported as 311 lbs. Has lost 12 lbs (3.7% of TBW) since starting therapy plan. Due to tolerating well and indication for further weight loss, plan to continue titrating Zepbound to maximally tolerated dose.  Increase Zepbound to 5mg once weekly after completing remaining dose of 2.5mg.  Continue to focus on lifestyle modifications as discussed.

## 2024-06-23 ENCOUNTER — HOSPITAL ENCOUNTER (OUTPATIENT)
Dept: RADIOLOGY | Facility: CLINIC | Age: 44
Discharge: HOME | End: 2024-06-23
Payer: COMMERCIAL

## 2024-06-23 DIAGNOSIS — Z00.00 WELL ADULT HEALTH CHECK: ICD-10-CM

## 2024-06-23 PROCEDURE — 75571 CT HRT W/O DYE W/CA TEST: CPT

## 2024-06-24 ENCOUNTER — APPOINTMENT (OUTPATIENT)
Dept: SLEEP MEDICINE | Facility: CLINIC | Age: 44
End: 2024-06-24
Payer: COMMERCIAL

## 2024-06-24 LAB
ANABASINE UR-MCNC: <5 NG/ML
COTININE UR-MCNC: <15 NG/ML
NICOTINE UR-MCNC: <15 NG/ML
TRANS-3-OH-COTININE UR-MCNC: <50 NG/ML

## 2024-06-29 DIAGNOSIS — E66.01 CLASS 3 SEVERE OBESITY DUE TO EXCESS CALORIES WITHOUT SERIOUS COMORBIDITY WITH BODY MASS INDEX (BMI) OF 45.0 TO 49.9 IN ADULT (MULTI): Primary | ICD-10-CM

## 2024-06-29 DIAGNOSIS — Z91.89 AT RISK FOR SLEEP APNEA: ICD-10-CM

## 2024-07-18 ENCOUNTER — APPOINTMENT (OUTPATIENT)
Dept: PHARMACY | Facility: HOSPITAL | Age: 44
End: 2024-07-18
Payer: COMMERCIAL

## 2024-07-18 DIAGNOSIS — E66.01 CLASS 3 SEVERE OBESITY DUE TO EXCESS CALORIES WITHOUT SERIOUS COMORBIDITY WITH BODY MASS INDEX (BMI) OF 45.0 TO 49.9 IN ADULT (MULTI): ICD-10-CM

## 2024-07-18 RX ORDER — TIRZEPATIDE 7.5 MG/.5ML
7.5 INJECTION, SOLUTION SUBCUTANEOUS
Qty: 2 ML | Refills: 0 | Status: SHIPPED | OUTPATIENT
Start: 2024-07-18

## 2024-07-18 NOTE — ASSESSMENT & PLAN NOTE
Patient's current weight reported as 305 lbs. Has lost 18 lbs (5.6% of TBW) since starting therapy plan.  Current regimen includes: Zepbound 5mg once weekly  Rationale for plan: Tolerating increase dose of Zepbound well, no adverse effects. Due to indication for further weight loss, plan to continue titrating to maximally tolerated dose.    Medication Changes:  INCREASE  Zepbound 7.5mg once weekly

## 2024-07-18 NOTE — PROGRESS NOTES
Clinical Pharmacy Appointment    Patient ID: Chuck Dunn is a 43 y.o. male who presents for Obesity.    Pt is here for Follow Up appointment.   Referring Provider: Willy Calzada DO  PCP: Willy Calzada DO, last visit: 5/3/24, next visit: 9/6/24    Subjective   HPI  PMH significant for Obesity, acute appendicitis s/p appendectomy.  Special needs/barriers to therapy: None identified    Drug Interactions  No significant drug interactions were noted.    Medication System Management  Patients preferred pharmacy: [facility]  Adherence/Organization: No current concerns  Affordability/Accessibility: No current concerns      WEIGHT LOSS  BMI Readings from Last 1 Encounters:   06/18/24 43.47 kg/m²   Starting weight: 323 lbs. (5/3/24)  Current weight: 305 lbs.    Lifestyle  Diet: Following with nutrition. Ketogenic diet.  Physical Activity: Trying to increase, limited by frequent travel for work    Non-Pharmacological Therapy  Weight loss techniques attempted:  Surgical intervention: Sleeve gastrectomy, 2017    Pharmacological Therapy  Current Medications:  Zepbound 5mg once weekly (Sundays, 3 doses completed)   Previous Medications: None     Adherence: Takes medication as directed and reports no missed doses  Adverse Effects: None      Objective   Allergies   Allergen Reactions    Bee Venom Protein (Honey Bee) Swelling    Venom-Wasp Swelling     Social History     Social History Narrative    Not on file      Medication Review  Current Outpatient Medications   Medication Instructions    acetaminophen-codeine (Tylenol w/ Codeine #3) 300-30 mg tablet 1 tablet, oral, Every 6 hours PRN    CALCIUM-MAG OXIDE-VITAMIN D3 ORAL 1 tablet, oral, 2 times daily    losartan-hydrochlorothiazide (Hyzaar) 100-12.5 mg tablet 1 tablet, oral, Daily    multivitamin tablet 1 tablet, oral, Daily    omega 3-dha-epa-fish oil (Fish OiL) 1,000 mg (120 mg-180 mg) capsule 1 capsule, oral, Every evening    omeprazole OTC  (PRILOSEC OTC) 20 mg, oral, 2 times daily before meals, Do not crush, chew, or split.     Zepbound 7.5 mg, subcutaneous, Every 7 days      Vitals  BP Readings from Last 2 Encounters:   05/30/24 135/88   05/18/24 (!) 150/93     BMI Readings from Last 1 Encounters:   06/18/24 43.47 kg/m²      Labs  A1C  Lab Results   Component Value Date    HGBA1C 5.6 10/27/2021     BMP  Lab Results   Component Value Date    CALCIUM 9.9 06/20/2024     06/20/2024    K 4.4 06/20/2024    CO2 28 06/20/2024     06/20/2024    BUN 14 06/20/2024    CREATININE 0.87 06/20/2024    EGFR >90 06/20/2024     LFTs  Lab Results   Component Value Date    ALT 39 06/20/2024    AST 25 06/20/2024    ALKPHOS 40 06/20/2024    BILITOT 0.6 06/20/2024     FLP  Lab Results   Component Value Date    TRIG 86 06/20/2024    CHOL 204 (H) 06/20/2024    LDLF 131 (H) 03/27/2023    LDLCALC 141 (H) 06/20/2024    HDL 46.3 06/20/2024     Urine Microalbumin  Lab Results   Component Value Date    MICROALBCREA 7.3 06/20/2024     Weight Management  Wt Readings from Last 3 Encounters:   06/18/24 141 kg (311 lb 11.2 oz)   05/18/24 149 kg (328 lb)   05/03/24 147 kg (323 lb 3.2 oz)      There is no height or weight on file to calculate BMI.     Assessment/Plan   Problem List Items Addressed This Visit       Class 3 severe obesity due to excess calories without serious comorbidity with body mass index (BMI) of 45.0 to 49.9 in adult (Multi)     Patient's current weight reported as 305 lbs. Has lost 18 lbs (5.6% of TBW) since starting therapy plan.  Current regimen includes: Zepbound 5mg once weekly  Rationale for plan: Tolerating increase dose of Zepbound well, no adverse effects. Due to indication for further weight loss, plan to continue titrating to maximally tolerated dose.    Medication Changes:  INCREASE  Zepbound 7.5mg once weekly         Relevant Medications    tirzepatide, weight loss, (Zepbound) 7.5 mg/0.5 mL injection    Other Relevant Orders    Follow Up In  [facility] - Pharmacy     Monitoring and Education:  Counseled patient on relevant MOA, expectations, side effects, duration of therapy, contraindications, administration, and monitoring parameters.  All questions and concerns addressed. Contact pharmacist with any further questions or concerns prior to next appointment.     Clinical Pharmacist follow-up: 8/15/24 at 3:30pm, Telehealth visit    Continue all meds under the continuation of care with the referring provider and clinical pharmacy team.    Thank you,  Betty Caruso, PharmD  Clinical Pharmacist  [phone]    Verbal consent to manage patient's drug therapy was obtained from the patient. They were informed they may decline to participate or withdraw from participation in pharmacy services at any time.

## 2024-08-15 ENCOUNTER — APPOINTMENT (OUTPATIENT)
Dept: PHARMACY | Facility: HOSPITAL | Age: 44
End: 2024-08-15
Payer: COMMERCIAL

## 2024-08-15 DIAGNOSIS — E66.01 CLASS 3 SEVERE OBESITY DUE TO EXCESS CALORIES WITHOUT SERIOUS COMORBIDITY WITH BODY MASS INDEX (BMI) OF 45.0 TO 49.9 IN ADULT (MULTI): ICD-10-CM

## 2024-08-15 RX ORDER — TIRZEPATIDE 10 MG/.5ML
10 INJECTION, SOLUTION SUBCUTANEOUS
Qty: 2 ML | Refills: 0 | Status: SHIPPED | OUTPATIENT
Start: 2024-08-15

## 2024-08-15 NOTE — PROGRESS NOTES
"  Clinical Pharmacy Appointment    Patient ID: Chuck Dunn \"Toni\" is a 43 y.o. male who presents for Obesity.    Pt is here for Follow Up appointment.     Referring Provider: Willy Calzada DO  PCP: Willy Calzada DO   Last visit with PCP: 05/03/24   Next visit with PCP: 09/06/24    Subjective   HPI  WEIGHT LOSS  PMH significant for obesity, acute appendicitis with hx of appendectomy.  Special needs/barriers to therapy: None identified    BMI Readings from Last 1 Encounters:   06/18/24 43.47 kg/m²   Starting weight: 323 lbs. (05/03/24)  Current weight: 300 lbs. 09/14/24    Lab Results   Component Value Date    HGBA1C 5.6 10/27/2021    GLUCOSE 84 06/20/2024       Lifestyle  Diet: 1-3 meals/day. Low carb/keto/carnivore, may skip meals  Physical Activity: hard to exercise when moving around    Non-Pharmacological Therapy  Weight loss techniques attempted:  Surgical intervention: sleeve gastrectomy, 2017    Pharmacological Therapy  Current Medications: Zepbound 7.5mg weekly on Sunday  Previous Medications: None     Adherence: Takes medication as directed and reports no missed doses  Adverse Effects: none    Drug Interactions  No relevant drug interactions were noted.    Medication System Management  Patient's preferred pharmacy: [facility]  Adherence/Organization: Good  Affordability/Accessibility: Good      Objective   Allergies   Allergen Reactions    Bee Venom Protein (Honey Bee) Swelling    Venom-Wasp Swelling     Social History     Social History Narrative    Not on file      Medication Review  Current Outpatient Medications   Medication Instructions    acetaminophen-codeine (Tylenol w/ Codeine #3) 300-30 mg tablet 1 tablet, oral, Every 6 hours PRN    CALCIUM-MAG OXIDE-VITAMIN D3 ORAL 1 tablet, oral, 2 times daily    losartan-hydrochlorothiazide (Hyzaar) 100-12.5 mg tablet 1 tablet, oral, Daily    multivitamin tablet 1 tablet, oral, Daily    omega 3-dha-epa-fish oil (Fish OiL) 1,000 mg (120 " mg-180 mg) capsule 1 capsule, oral, Every evening    omeprazole OTC (PRILOSEC OTC) 20 mg, oral, 2 times daily before meals, Do not crush, chew, or split.     Zepbound 10 mg, subcutaneous, Every 7 days      Vitals  BP Readings from Last 2 Encounters:   05/30/24 135/88   05/18/24 (!) 150/93     BMI Readings from Last 1 Encounters:   06/18/24 43.47 kg/m²      Labs  A1C  Lab Results   Component Value Date    HGBA1C 5.6 10/27/2021     BMP  Lab Results   Component Value Date    CALCIUM 9.9 06/20/2024     06/20/2024    K 4.4 06/20/2024    CO2 28 06/20/2024     06/20/2024    BUN 14 06/20/2024    CREATININE 0.87 06/20/2024    EGFR >90 06/20/2024     LFTs  Lab Results   Component Value Date    ALT 39 06/20/2024    AST 25 06/20/2024    ALKPHOS 40 06/20/2024    BILITOT 0.6 06/20/2024     FLP  Lab Results   Component Value Date    TRIG 86 06/20/2024    CHOL 204 (H) 06/20/2024    LDLF 131 (H) 03/27/2023    LDLCALC 141 (H) 06/20/2024    HDL 46.3 06/20/2024     Urine Microalbumin  Lab Results   Component Value Date    MICROALBCREA 7.3 06/20/2024     Weight Management  Wt Readings from Last 3 Encounters:   06/18/24 141 kg (311 lb 11.2 oz)   05/18/24 149 kg (328 lb)   05/03/24 147 kg (323 lb 3.2 oz)      There is no height or weight on file to calculate BMI.     Assessment/Plan   Problem List Items Addressed This Visit       Class 3 severe obesity due to excess calories without serious comorbidity with body mass index (BMI) of 45.0 to 49.9 in adult (Multi)     Patient's current weight reported as 300. Has lost 23 lbs (7% of TBW) since starting therapy plan.   Current regimen includes Zepbound 7.5mg weekly on Sunday. Patient is tolerating medication very well. Due to lack of side effects and patient preferance, plan to increase Zepbound to 10mg weekly.    Medication Changes:  INCREASE  Zepbound 10mg weekly    Future Considerations:  Monitor cholesterol due to carnivore diet    Monitoring and Education:  Monitored for side  effects of Zepbound  Recommended small movements when working to increase physical activity  Recommended to maintain calories and protein and signs of weakness being concerning for malnourished         Relevant Medications    tirzepatide, weight loss, (Zepbound) 10 mg/0.5 mL injection    Other Relevant Orders    Follow Up In [facility] - Pharmacy     Clinical Pharmacist follow-up: 09/12/24 at 4PM, Telehealth visit    Continue all meds under the continuation of care with the referring provider and clinical pharmacy team.    Thank you,  Anuradha Walsh, PGY1 Pharmacy Resident  Betty Caruso, PharmD  Clinical Pharmacist  [phone]    Verbal consent to manage patient's drug therapy was obtained from the patient. They were informed they may decline to participate or withdraw from participation in pharmacy services at any time.

## 2024-08-15 NOTE — ASSESSMENT & PLAN NOTE
Patient's current weight reported as 300. Has lost 23 lbs (7% of TBW) since starting therapy plan.   Current regimen includes Zepbound 7.5mg weekly on Sunday. Patient is tolerating medication very well. Due to lack of side effects and patient preferance, plan to increase Zepbound to 10mg weekly.    Medication Changes:  INCREASE  Zepbound 10mg weekly    Future Considerations:  Monitor cholesterol due to carnivore diet    Monitoring and Education:  Monitored for side effects of Zepbound  Recommended small movements when working to increase physical activity  Recommended to maintain calories and protein and signs of weakness being concerning for malnourished

## 2024-09-06 ENCOUNTER — PHARMACY VISIT (OUTPATIENT)
Dept: PHARMACY | Facility: CLINIC | Age: 44
End: 2024-09-06
Payer: COMMERCIAL

## 2024-09-06 ENCOUNTER — APPOINTMENT (OUTPATIENT)
Dept: PRIMARY CARE | Facility: CLINIC | Age: 44
End: 2024-09-06
Payer: COMMERCIAL

## 2024-09-06 VITALS
HEIGHT: 71 IN | BODY MASS INDEX: 42.59 KG/M2 | SYSTOLIC BLOOD PRESSURE: 112 MMHG | RESPIRATION RATE: 16 BRPM | HEART RATE: 90 BPM | DIASTOLIC BLOOD PRESSURE: 80 MMHG | TEMPERATURE: 97.6 F | WEIGHT: 304.2 LBS | OXYGEN SATURATION: 98 %

## 2024-09-06 DIAGNOSIS — E66.01 CLASS 3 SEVERE OBESITY DUE TO EXCESS CALORIES WITHOUT SERIOUS COMORBIDITY WITH BODY MASS INDEX (BMI) OF 45.0 TO 49.9 IN ADULT (MULTI): ICD-10-CM

## 2024-09-06 DIAGNOSIS — Z91.89 AT RISK FOR OBSTRUCTIVE SLEEP APNEA: ICD-10-CM

## 2024-09-06 DIAGNOSIS — Z23 NEED FOR INFLUENZA VACCINATION: ICD-10-CM

## 2024-09-06 DIAGNOSIS — Z23 IMMUNIZATION DUE: Primary | ICD-10-CM

## 2024-09-06 PROCEDURE — 99212 OFFICE O/P EST SF 10 MIN: CPT | Performed by: INTERNAL MEDICINE

## 2024-09-06 PROCEDURE — 90656 IIV3 VACC NO PRSV 0.5 ML IM: CPT | Performed by: INTERNAL MEDICINE

## 2024-09-06 PROCEDURE — 90472 IMMUNIZATION ADMIN EACH ADD: CPT | Performed by: INTERNAL MEDICINE

## 2024-09-06 PROCEDURE — 90471 IMMUNIZATION ADMIN: CPT | Performed by: INTERNAL MEDICINE

## 2024-09-06 PROCEDURE — 90677 PCV20 VACCINE IM: CPT | Performed by: INTERNAL MEDICINE

## 2024-09-06 PROCEDURE — RXMED WILLOW AMBULATORY MEDICATION CHARGE

## 2024-09-06 PROCEDURE — 3008F BODY MASS INDEX DOCD: CPT | Performed by: INTERNAL MEDICINE

## 2024-09-06 RX ORDER — TIRZEPATIDE 10 MG/.5ML
10 INJECTION, SOLUTION SUBCUTANEOUS
Qty: 2 ML | Refills: 0 | Status: SHIPPED | OUTPATIENT
Start: 2024-09-06

## 2024-09-06 ASSESSMENT — PAIN SCALES - GENERAL: PAINLEVEL: 0-NO PAIN

## 2024-09-06 NOTE — PROGRESS NOTES
"Subjective   Chuck Dunn \"Toni\" is a 43 y.o. male who presents for Follow-up (Test results ).  Patient would like his flu vaccine       Patient has been off of the zepbound due to a shortage, states that as soon as he gets his refill he will start it back up   Patient has lost 7 pounds since his June appointment   Sleep study has not been done yet, was scheduled for a in lab study but insurance would not cover an in lab study, at home study was ordered 6.29.2024 but patient was not aware   Patient states that with his weight loss his sleep has improved           Review of Systems   All other systems reviewed and are negative.      Objective   /80   Pulse 90   Temp 36.4 °C (97.6 °F)   Resp 16   Ht 1.803 m (5' 11\")   Wt 138 kg (304 lb 3.2 oz)   SpO2 98%   BMI 42.43 kg/m²       Physical Exam  Constitutional:       Appearance: Normal appearance.   HENT:      Head: Normocephalic.      Mouth/Throat:      Mouth: Mucous membranes are moist.      Pharynx: Oropharynx is clear.      Comments: Type IV oral anatomy    Eyes:      Conjunctiva/sclera: Conjunctivae normal.   Cardiovascular:      Rate and Rhythm: Normal rate and regular rhythm.      Heart sounds: Normal heart sounds.   Pulmonary:      Effort: Pulmonary effort is normal.      Breath sounds: Normal breath sounds.   Abdominal:      General: Abdomen is protuberant.      Palpations: Abdomen is soft.      Tenderness: There is no abdominal tenderness.   Musculoskeletal:      Cervical back: Neck supple.   Skin:     General: Skin is warm and dry.   Neurological:      Mental Status: He is alert.         Assessment/Plan   Problem List Items Addressed This Visit       Class 3 severe obesity due to excess calories without serious comorbidity with body mass index (BMI) of 45.0 to 49.9 in adult (Multi)    Relevant Medications    tirzepatide, weight loss, (Zepbound) 10 mg/0.5 mL injection    Other Relevant Orders    Home sleep apnea test (HSAT)     Other Visit " Diagnoses       Immunization due    -  Primary    Relevant Orders    Pneumococcal conjugate vaccine, 20-valent (PREVNAR 20) (Completed)    Need for influenza vaccination        Relevant Orders    Flu vaccine, trivalent, preservative free, age 6 months and greater (Fluraix/Fluzone/Flulaval) (Completed)    At risk for obstructive sleep apnea        Relevant Orders    Home sleep apnea test (HSAT)          Encounter Diagnoses   Name Primary?    Need for influenza vaccination     Immunization due Yes    Class 3 severe obesity due to excess calories without serious comorbidity with body mass index (BMI) of 45.0 to 49.9 in adult (Multi)     At risk for obstructive sleep apnea      Willy Calzada, DO

## 2024-09-12 ENCOUNTER — APPOINTMENT (OUTPATIENT)
Dept: PHARMACY | Facility: HOSPITAL | Age: 44
End: 2024-09-12
Payer: COMMERCIAL

## 2024-09-12 DIAGNOSIS — E66.01 CLASS 3 SEVERE OBESITY DUE TO EXCESS CALORIES WITHOUT SERIOUS COMORBIDITY WITH BODY MASS INDEX (BMI) OF 45.0 TO 49.9 IN ADULT: ICD-10-CM

## 2024-09-12 NOTE — PROGRESS NOTES
"  Clinical Pharmacy Appointment    Patient ID: Chuck Dunn \"Joelle" is a 43 y.o. male who presents for Obesity.    Pt is here for Follow Up appointment.   Referring Provider: Willy Calzada DO  PCP: Willy Calzada DO, last visit: 5/3/24, next visit: 9/6/24    Subjective   HPI  PMH significant for Obesity, acute appendicitis s/p appendectomy.  Special needs/barriers to therapy: None identified    Drug Interactions  No relevant drug interactions were noted.    Medication System Management  Patients preferred pharmacy: "[facility]  Adherence/Organization: No current concerns  Affordability/Accessibility: No current concerns      WEIGHT LOSS  BMI Readings from Last 1 Encounters:   09/06/24 42.43 kg/m²   Starting weight: 323 lbs. (5/3/24)  Previous weight: 300 lbs. (8/15/24)  Current weight: 298 lbs.    Lifestyle  Diet: Following with nutrition. Ketogenic diet. [place] on smaller portions.  Physical Activity: Trying to increase, limited by frequent travel for work    Non-Pharmacological Therapy  Weight loss techniques attempted:  Surgical intervention: Sleeve gastrectomy, 2017    Pharmacological Therapy  Current Medications:  Zepbound 10mg once weekly (Sundays, 1 doses completed)   Previous Medications: None    Adherence: Takes medication as directed and reports no missed doses  Adverse Effects: None      Objective   Allergies   Allergen Reactions    Bee Venom Protein (Honey Bee) Swelling    Venom-Wasp Swelling     Social History     Social History Narrative    Not on file      Medication Review  Current Outpatient Medications   Medication Instructions    CALCIUM-MAG OXIDE-VITAMIN D3 ORAL 1 tablet, oral, 2 times daily    losartan-hydrochlorothiazide (Hyzaar) 100-12.5 mg tablet 1 tablet, oral, Daily    multivitamin tablet 1 tablet, oral, Daily    omega 3-dha-epa-fish oil (Fish OiL) 1,000 mg (120 mg-180 mg) capsule 1 capsule, oral, Every evening    omeprazole OTC (PRILOSEC OTC) 20 mg, oral, 2 times " daily before meals, Do not crush, chew, or split.     Zepbound 10 mg, subcutaneous, Every 7 days      Vitals  BP Readings from Last 2 Encounters:   09/06/24 112/80   05/30/24 135/88     BMI Readings from Last 1 Encounters:   09/06/24 42.43 kg/m²      Labs  A1C  Lab Results   Component Value Date    HGBA1C 5.6 10/27/2021     BMP  Lab Results   Component Value Date    CALCIUM 9.9 06/20/2024     06/20/2024    K 4.4 06/20/2024    CO2 28 06/20/2024     06/20/2024    BUN 14 06/20/2024    CREATININE 0.87 06/20/2024    EGFR >90 06/20/2024     LFTs  Lab Results   Component Value Date    ALT 39 06/20/2024    AST 25 06/20/2024    ALKPHOS 40 06/20/2024    BILITOT 0.6 06/20/2024     FLP  Lab Results   Component Value Date    TRIG 86 06/20/2024    CHOL 204 (H) 06/20/2024    LDLF 131 (H) 03/27/2023    LDLCALC 141 (H) 06/20/2024    HDL 46.3 06/20/2024     Urine Microalbumin  Lab Results   Component Value Date    MICROALBCREA 7.3 06/20/2024     Weight Management  Wt Readings from Last 3 Encounters:   09/06/24 138 kg (304 lb 3.2 oz)   06/18/24 141 kg (311 lb 11.2 oz)   05/18/24 149 kg (328 lb)      There is no height or weight on file to calculate BMI.     Assessment/Plan   Problem List Items Addressed This Visit       Class 3 severe obesity due to excess calories without serious comorbidity with body mass index (BMI) of 45.0 to 49.9 in adult (Multi)     Patient's current weight reported as 298 lbs. Has lost 25 lbs. (7.7% of TBW) since starting therapy plan.  Current regimen includes: Zepbound 10mg weekly  Rationale for plan: Patient has taken 1 dose of Zepbound 10mg. He missed 1 dose due to drug shortage at local pharmacy. Prefers to continue receiving at [facility]. Follow-up when patient has completed 3-4 doses to assess.    Medication Changes:  CONTINUE  Zepbound 10mg once weekly         Relevant Orders    Follow Up In [facility] - Pharmacy     Monitoring and Education:  Counseled patient on  relevant MOA, expectations, side effects, duration of therapy, contraindications, administration, and monitoring parameters.  All questions and concerns addressed. Contact pharmacist with any further questions or concerns prior to next appointment.     Clinical Pharmacist follow-up: 10/3/24 at 4:00pm, Telehealth visit    Continue all meds under the continuation of care with the referring provider and clinical pharmacy team.    Thank you,  Betty Caruso, PharmD  Clinical Pharmacist  [phone]    Verbal consent to manage patient's drug therapy was obtained from the patient. They were informed they may decline to participate or withdraw from participation in pharmacy services at any time.

## 2024-09-17 NOTE — ASSESSMENT & PLAN NOTE
Patient's current weight reported as 298 lbs. Has lost 25 lbs. (7.7% of TBW) since starting therapy plan.  Current regimen includes: Zepbound 10mg weekly  Rationale for plan: Patient has taken 1 dose of Zepbound 10mg. He missed 1 dose due to drug shortage at local pharmacy. Prefers to continue receiving at [facility] pharmacy. Follow-up when patient has completed 3-4 doses to assess.    Medication Changes:  CONTINUE  Zepbound 10mg once weekly

## 2024-10-03 ENCOUNTER — APPOINTMENT (OUTPATIENT)
Dept: PHARMACY | Facility: HOSPITAL | Age: 44
End: 2024-10-03
Payer: COMMERCIAL

## 2024-10-03 DIAGNOSIS — E66.813 CLASS 3 SEVERE OBESITY DUE TO EXCESS CALORIES WITHOUT SERIOUS COMORBIDITY WITH BODY MASS INDEX (BMI) OF 45.0 TO 49.9 IN ADULT: ICD-10-CM

## 2024-10-03 DIAGNOSIS — E66.01 CLASS 3 SEVERE OBESITY DUE TO EXCESS CALORIES WITHOUT SERIOUS COMORBIDITY WITH BODY MASS INDEX (BMI) OF 45.0 TO 49.9 IN ADULT: ICD-10-CM

## 2024-10-03 PROCEDURE — RXMED WILLOW AMBULATORY MEDICATION CHARGE

## 2024-10-03 RX ORDER — TIRZEPATIDE 12.5 MG/.5ML
12.5 INJECTION, SOLUTION SUBCUTANEOUS
Qty: 2 ML | Refills: 0 | Status: SHIPPED | OUTPATIENT
Start: 2024-10-03

## 2024-10-03 NOTE — PROGRESS NOTES
"  Clinical Pharmacy Appointment    Patient ID: Chuck Dunn \"Joelle" is a 43 y.o. male who presents for weight loss.     Pt is here for Follow Up appointment.     Referring Provider: Willy Calzada DO  PCP: Willy Calzada DO   Last visit with PCP: 09/06/24   Next visit with PCP: 12/06/24      Subjective   PMHx: acute appendicitis (s/p appendectomy)    HPI  WEIGHT LOSS  BMI Readings from Last 3 Encounters:   09/06/24 42.43 kg/m²   06/18/24 43.47 kg/m²   05/18/24 45.75 kg/m²      Starting weight: 323 lbs. (05/03/24)  Current weight: 290 lbs. 2 weeks ago     Lifestyle  Diet: Keto diet-following nutrition-no concerns for malnutrition  Physical Activity: Swimming; trying to get more active at work    Non-Pharmacological Therapy  Weight loss techniques attempted:  Surgical intervention: sleeve gastrectomy (2017)    Pharmacological Therapy  Current Medications: Zepbound 10mg weekly on Sundays  Adverse Effects: None  Previous Medications: None       Drug Interactions  No relevant drug interactions were noted.    Medication System Management  Patient's preferred pharmacy: [facility]  Adherence/Organization: No concerns  Affordability/Accessibility: No concerns      Objective   Allergies   Allergen Reactions    Bee Venom Protein (Honey Bee) Swelling    Venom-Wasp Swelling     Social History     Social History Narrative    Not on file      Medication Review  Current Outpatient Medications   Medication Instructions    CALCIUM-MAG OXIDE-VITAMIN D3 ORAL 1 tablet, oral, 2 times daily    losartan-hydrochlorothiazide (Hyzaar) 100-12.5 mg tablet 1 tablet, oral, Daily    multivitamin tablet 1 tablet, oral, Daily    omega 3-dha-epa-fish oil (Fish OiL) 1,000 mg (120 mg-180 mg) capsule 1 capsule, oral, Every evening    omeprazole OTC (PRILOSEC OTC) 20 mg, oral, 2 times daily before meals, Do not crush, chew, or split.     Zepbound 10 mg, subcutaneous, Every 7 days      Vitals  BP Readings from Last 2 Encounters: "   09/06/24 112/80   05/30/24 135/88     BMI Readings from Last 1 Encounters:   09/06/24 42.43 kg/m²      Labs  A1C  Lab Results   Component Value Date    HGBA1C 5.6 10/27/2021     BMP  Lab Results   Component Value Date    CALCIUM 9.9 06/20/2024     06/20/2024    K 4.4 06/20/2024    CO2 28 06/20/2024     06/20/2024    BUN 14 06/20/2024    CREATININE 0.87 06/20/2024    EGFR >90 06/20/2024     LFTs  Lab Results   Component Value Date    ALT 39 06/20/2024    AST 25 06/20/2024    ALKPHOS 40 06/20/2024    BILITOT 0.6 06/20/2024     FLP  Lab Results   Component Value Date    TRIG 86 06/20/2024    CHOL 204 (H) 06/20/2024    LDLF 131 (H) 03/27/2023    LDLCALC 141 (H) 06/20/2024    HDL 46.3 06/20/2024     Urine Microalbumin  Lab Results   Component Value Date    MICROALBCREA 7.3 06/20/2024     Weight Management  Wt Readings from Last 3 Encounters:   09/06/24 138 kg (304 lb 3.2 oz)   06/18/24 141 kg (311 lb 11.2 oz)   05/18/24 149 kg (328 lb)      There is no height or weight on file to calculate BMI.     Assessment/Plan   Problem List Items Addressed This Visit    None  Current regimen includes Zepbound 10mg. Patient expresses no concerns, no side effects, and follows with nutrition for diet. Patient's current weight reported as 290 lbs. Has lost 33 lbs (10.2% of TBW) since starting therapy plan. Due to good tolerance and patient preference, plan to increase Zepbound.    Medication Changes:  CONTINUE    INCREASE  Zepbound 12.5mg weekly      Monitoring and Education:  Counseled patient on relevant MOA, expectations, side effects, duration of therapy, contraindications, administration, and monitoring parameters.  All questions and concerns addressed. Contact pharmacist with any further questions or concerns prior to next appointment.       Clinical Pharmacist follow-up: 10/31/24 at 4PM, Telehealth visit    Continue all meds under the continuation of care with the referring provider and clinical pharmacy  team.    Thank you,  Anuradha Walsh  Pharmacy Resident    Verbal consent to manage patient's drug therapy was obtained from the patient. They were informed they may decline to participate or withdraw from participation in pharmacy services at any time.

## 2024-10-05 ENCOUNTER — PHARMACY VISIT (OUTPATIENT)
Dept: PHARMACY | Facility: CLINIC | Age: 44
End: 2024-10-05
Payer: COMMERCIAL

## 2024-10-31 ENCOUNTER — APPOINTMENT (OUTPATIENT)
Dept: PHARMACY | Facility: HOSPITAL | Age: 44
End: 2024-10-31
Payer: COMMERCIAL

## 2024-10-31 DIAGNOSIS — E66.813 CLASS 3 SEVERE OBESITY DUE TO EXCESS CALORIES WITHOUT SERIOUS COMORBIDITY WITH BODY MASS INDEX (BMI) OF 45.0 TO 49.9 IN ADULT: ICD-10-CM

## 2024-10-31 DIAGNOSIS — E66.01 CLASS 3 SEVERE OBESITY DUE TO EXCESS CALORIES WITHOUT SERIOUS COMORBIDITY WITH BODY MASS INDEX (BMI) OF 45.0 TO 49.9 IN ADULT: ICD-10-CM

## 2024-10-31 RX ORDER — TIRZEPATIDE 15 MG/.5ML
15 INJECTION, SOLUTION SUBCUTANEOUS
Qty: 2 ML | Refills: 2 | Status: SHIPPED | OUTPATIENT
Start: 2024-10-31

## 2024-11-01 PROCEDURE — RXMED WILLOW AMBULATORY MEDICATION CHARGE

## 2024-11-04 NOTE — ASSESSMENT & PLAN NOTE
Patient's current weight reported as 280 lbs. Has lost 43 lbs. (13% of TBW) since starting therapy plan.  Current regimen includes: Zepbound 12.5mg once weekly  Rationale for plan: Patient tolerating dose increase of Zepbound well, no adverse effects. Continuing to lose weight, but notes that rate has slowed. Has not been eating breakfast lately, plans to get back into habit, and still has not been very active. Due to indication for further weight loss and patient preference, plan to continue titrating Zepbound to max dose.    Medication Changes:  INCREASE  Zepbound 15mg once weekly

## 2024-11-04 NOTE — PROGRESS NOTES
"  Clinical Pharmacy Appointment    Patient ID: Chuck Dunn \"Joelle" is a 43 y.o. male who presents for Obesity.    Pt is here for Follow Up appointment.   Referring Provider: Willy Calzada DO  PCP: Willy Calzada DO, last visit: 9/6/24, next visit: 12/6/24    Subjective   HPI  PMH significant for Obesity, acute appendicitis s/p appendectomy.  Special needs/barriers to therapy: None identified    Drug Interactions  No relevant drug interactions were noted.    Medication System Management  Patients preferred pharmacy: [facility]  Adherence/Organization: No current concerns  Affordability/Accessibility: No current concerns      WEIGHT LOSS  BMI Readings from Last 1 Encounters:   09/06/24 42.43 kg/m²   Starting weight: 323 lbs. (5/3/24)  Previous weight: 290 lbs. (10/3/24)  Current weight: 280 lbs.    Lifestyle  Diet: Following with nutrition. Ketogenic diet. Castrejon on smaller portions. Not he has not been eating breakfast recently.  Physical Activity: Trying to increase, limited by frequent travel for work    Non-Pharmacological Therapy  Weight loss techniques attempted:  Surgical intervention: Sleeve gastrectomy, 2017    Pharmacological Therapy  Current Medications:  Zepbound 12.5mg once weekly (Sundays)  Previous Medications: None    Adherence: Takes medication as directed and reports no missed doses  Adverse Effects: None      Objective   Allergies   Allergen Reactions    Bee Venom Protein (Honey Bee) Swelling    Venom-Wasp Swelling     Social History     Social History Narrative    Not on file      Medication Review  Current Outpatient Medications   Medication Instructions    CALCIUM-MAG OXIDE-VITAMIN D3 ORAL 1 tablet, oral, 2 times daily    losartan-hydrochlorothiazide (Hyzaar) 100-12.5 mg tablet 1 tablet, oral, Daily    multivitamin tablet 1 tablet, oral, Daily    omega 3-dha-epa-fish oil (Fish OiL) 1,000 mg (120 mg-180 mg) capsule 1 capsule, oral, Every evening    omeprazole OTC " (PRILOSEC OTC) 20 mg, oral, 2 times daily before meals, Do not crush, chew, or split.     Zepbound 15 mg, subcutaneous, Every 7 days      Vitals  BP Readings from Last 2 Encounters:   09/06/24 112/80   05/30/24 135/88     BMI Readings from Last 1 Encounters:   09/06/24 42.43 kg/m²      Labs  A1C  Lab Results   Component Value Date    HGBA1C 5.6 10/27/2021     BMP  Lab Results   Component Value Date    CALCIUM 9.9 06/20/2024     06/20/2024    K 4.4 06/20/2024    CO2 28 06/20/2024     06/20/2024    BUN 14 06/20/2024    CREATININE 0.87 06/20/2024    EGFR >90 06/20/2024     LFTs  Lab Results   Component Value Date    ALT 39 06/20/2024    AST 25 06/20/2024    ALKPHOS 40 06/20/2024    BILITOT 0.6 06/20/2024     FLP  Lab Results   Component Value Date    TRIG 86 06/20/2024    CHOL 204 (H) 06/20/2024    LDLF 131 (H) 03/27/2023    LDLCALC 141 (H) 06/20/2024    HDL 46.3 06/20/2024     Urine Microalbumin  Lab Results   Component Value Date    MICROALBCREA 7.3 06/20/2024     Weight Management  Wt Readings from Last 3 Encounters:   09/06/24 138 kg (304 lb 3.2 oz)   06/18/24 141 kg (311 lb 11.2 oz)   05/18/24 149 kg (328 lb)      There is no height or weight on file to calculate BMI.     Assessment/Plan   Problem List Items Addressed This Visit       Class 3 severe obesity due to excess calories without serious comorbidity with body mass index (BMI) of 45.0 to 49.9 in adult     Patient's current weight reported as 280 lbs. Has lost 43 lbs. (13% of TBW) since starting therapy plan.  Current regimen includes: Zepbound 12.5mg once weekly  Rationale for plan: Patient tolerating dose increase of Zepbound well, no adverse effects. Continuing to lose weight, but notes that rate has slowed. Has not been eating breakfast lately, plans to get back into habit, and still has not been very active. Due to indication for further weight loss and patient preference, plan to continue titrating Zepbound to max dose.    Medication  Changes:  INCREASE  Zepbound 15mg once weekly          Relevant Medications    tirzepatide, weight loss, (Zepbound) 15 mg/0.5 mL injection    Other Relevant Orders    Referral to Clinical Pharmacy     Monitoring and Education:  Counseled patient on relevant MOA, expectations, side effects, duration of therapy, contraindications, administration, and monitoring parameters.  All questions and concerns addressed. Contact pharmacist with any further questions or concerns prior to next appointment.     Clinical Pharmacist follow-up: 11/27/24 at 3:40pm, Telehealth visit    Continue all meds under the continuation of care with the referring provider and clinical pharmacy team.    Thank you,  Betty Caruso, PharmD  Clinical Pharmacist  [phone]    Verbal consent to manage patient's drug therapy was obtained from the patient. They were informed they may decline to participate or withdraw from participation in pharmacy services at any time.

## 2024-11-09 ENCOUNTER — PHARMACY VISIT (OUTPATIENT)
Dept: PHARMACY | Facility: CLINIC | Age: 44
End: 2024-11-09
Payer: COMMERCIAL

## 2024-11-27 ENCOUNTER — APPOINTMENT (OUTPATIENT)
Dept: PHARMACY | Facility: HOSPITAL | Age: 44
End: 2024-11-27
Payer: COMMERCIAL

## 2024-11-27 DIAGNOSIS — E66.813 CLASS 3 SEVERE OBESITY DUE TO EXCESS CALORIES WITHOUT SERIOUS COMORBIDITY WITH BODY MASS INDEX (BMI) OF 45.0 TO 49.9 IN ADULT: ICD-10-CM

## 2024-11-27 DIAGNOSIS — E66.01 CLASS 3 SEVERE OBESITY DUE TO EXCESS CALORIES WITHOUT SERIOUS COMORBIDITY WITH BODY MASS INDEX (BMI) OF 45.0 TO 49.9 IN ADULT: ICD-10-CM

## 2024-11-27 NOTE — PROGRESS NOTES
"  Clinical Pharmacy Appointment    Patient ID: Chuck Dunn \"Joelle" is a 43 y.o. male who presents for Obesity.    Pt is here for Follow Up appointment.   Referring Provider: Willy Calzada DO  PCP: Willy Calzada DO, last visit: 9/6/24, next visit: 12/6/24    Subjective   HPI  PMH significant for Obesity, acute appendicitis s/p appendectomy.  Special needs/barriers to therapy: None identified    Drug Interactions  No relevant drug interactions were noted.    Medication System Management  Patients preferred pharmacy: [facility]  Adherence/Organization: No current concerns  Affordability/Accessibility: No current concerns      WEIGHT LOSS  BMI Readings from Last 1 Encounters:   09/06/24 42.43 kg/m²   Starting weight: 323 lbs. (5/3/24)  Previous weight: 280 lbs. (10/31/24)  Current weight: 280 lbs.    Lifestyle  Diet: Following with nutrition. Ketogenic diet. Castrejon on smaller portions. Not he has not been eating breakfast recently.  Physical Activity: Trying to increase, limited by frequent travel for work    Non-Pharmacological Therapy  Weight loss techniques attempted:  Surgical intervention: Sleeve gastrectomy, 2017    Pharmacological Therapy  Current Medications:  Zepbound 15mg once weekly (Sundays)  Previous Medications: None    Adherence: Takes medication as directed and reports no missed doses  Adverse Effects: None      Objective   Allergies   Allergen Reactions    Bee Venom Protein (Honey Bee) Swelling    Venom-Wasp Swelling     Social History     Social History Narrative    Not on file      Medication Review  Current Outpatient Medications   Medication Instructions    CALCIUM-MAG OXIDE-VITAMIN D3 ORAL 1 tablet, oral, 2 times daily    losartan-hydrochlorothiazide (Hyzaar) 100-12.5 mg tablet 1 tablet, oral, Daily    multivitamin tablet 1 tablet, oral, Daily    omega 3-dha-epa-fish oil (Fish OiL) 1,000 mg (120 mg-180 mg) capsule 1 capsule, oral, Every evening    omeprazole OTC " (PRILOSEC OTC) 20 mg, oral, 2 times daily before meals, Do not crush, chew, or split.     Zepbound 15 mg, subcutaneous, Every 7 days      Vitals  BP Readings from Last 2 Encounters:   09/06/24 112/80   05/30/24 135/88     BMI Readings from Last 1 Encounters:   09/06/24 42.43 kg/m²      Labs  A1C  Lab Results   Component Value Date    HGBA1C 5.6 10/27/2021     BMP  Lab Results   Component Value Date    CALCIUM 9.9 06/20/2024     06/20/2024    K 4.4 06/20/2024    CO2 28 06/20/2024     06/20/2024    BUN 14 06/20/2024    CREATININE 0.87 06/20/2024    EGFR >90 06/20/2024     LFTs  Lab Results   Component Value Date    ALT 39 06/20/2024    AST 25 06/20/2024    ALKPHOS 40 06/20/2024    BILITOT 0.6 06/20/2024     FLP  Lab Results   Component Value Date    TRIG 86 06/20/2024    CHOL 204 (H) 06/20/2024    LDLF 131 (H) 03/27/2023    LDLCALC 141 (H) 06/20/2024    HDL 46.3 06/20/2024     Urine Microalbumin  Lab Results   Component Value Date    MICROALBCREA 7.3 06/20/2024     Weight Management  Wt Readings from Last 3 Encounters:   09/06/24 138 kg (304 lb 3.2 oz)   06/18/24 141 kg (311 lb 11.2 oz)   05/18/24 149 kg (328 lb)      There is no height or weight on file to calculate BMI.     Assessment/Plan   Problem List Items Addressed This Visit       Class 3 severe obesity due to excess calories without serious comorbidity with body mass index (BMI) of 45.0 to 49.9 in adult     Patient's current weight reported as 280 lbs. Has lost 43 lbs. (13% of TBW) since starting therapy plan.   Current regimen includes: Zepbound 15mg weekly  Rationale for plan: Patient tolerating dose increase of Zepbound well, no adverse effects. No additional weight loss in the past month. Patient has re-introduced diet pop and potential impact on metabolism and weight loss was discussed. Plan to reduce diet pop intake and assess if weight-loss resumes, will also continue to focus on low-carb nutrition and increasing physical  activity.    Medication Changes:  CONTINUE  Zepbound 15mg once weekly         Relevant Orders    Referral to Clinical Pharmacy     Monitoring and Education:  Counseled patient on relevant MOA, expectations, side effects, duration of therapy, contraindications, administration, and monitoring parameters.  All questions and concerns addressed. Contact pharmacist with any further questions or concerns prior to next appointment.     Clinical Pharmacist follow-up: 12/26/24 at 3:20pm, Telehealth visit    Continue all meds under the continuation of care with the referring provider and clinical pharmacy team.    Thank you,  Betty Caruso, PharmD  Clinical Pharmacist  [phone]    Verbal consent to manage patient's drug therapy was obtained from the patient. They were informed they may decline to participate or withdraw from participation in pharmacy services at any time.

## 2024-11-27 NOTE — ASSESSMENT & PLAN NOTE
Patient's current weight reported as 280 lbs. Has lost 43 lbs. (13% of TBW) since starting therapy plan.   Current regimen includes: Zepbound 15mg weekly  Rationale for plan: Patient tolerating dose increase of Zepbound well, no adverse effects. No additional weight loss in the past month. Patient has re-introduced diet pop and potential impact on metabolism and weight loss was discussed. Plan to reduce diet pop intake and assess if weight-loss resumes, will also continue to focus on low-carb nutrition and increasing physical activity.    Medication Changes:  CONTINUE  Zepbound 15mg once weekly

## 2024-11-30 PROCEDURE — RXMED WILLOW AMBULATORY MEDICATION CHARGE

## 2024-12-06 ENCOUNTER — APPOINTMENT (OUTPATIENT)
Dept: PRIMARY CARE | Facility: CLINIC | Age: 44
End: 2024-12-06
Payer: COMMERCIAL

## 2024-12-06 ENCOUNTER — PHARMACY VISIT (OUTPATIENT)
Dept: PHARMACY | Facility: CLINIC | Age: 44
End: 2024-12-06
Payer: COMMERCIAL

## 2024-12-06 VITALS
TEMPERATURE: 98.2 F | RESPIRATION RATE: 16 BRPM | WEIGHT: 290 LBS | SYSTOLIC BLOOD PRESSURE: 142 MMHG | HEART RATE: 78 BPM | DIASTOLIC BLOOD PRESSURE: 98 MMHG | BODY MASS INDEX: 40.6 KG/M2 | OXYGEN SATURATION: 97 % | HEIGHT: 71 IN

## 2024-12-06 DIAGNOSIS — Z23 IMMUNIZATION DUE: ICD-10-CM

## 2024-12-06 DIAGNOSIS — E66.01 CLASS 3 SEVERE OBESITY DUE TO EXCESS CALORIES WITHOUT SERIOUS COMORBIDITY WITH BODY MASS INDEX (BMI) OF 45.0 TO 49.9 IN ADULT: Primary | ICD-10-CM

## 2024-12-06 DIAGNOSIS — E66.813 CLASS 3 SEVERE OBESITY DUE TO EXCESS CALORIES WITHOUT SERIOUS COMORBIDITY WITH BODY MASS INDEX (BMI) OF 45.0 TO 49.9 IN ADULT: Primary | ICD-10-CM

## 2024-12-06 DIAGNOSIS — I10 PRIMARY HYPERTENSION: ICD-10-CM

## 2024-12-06 PROBLEM — K35.891: Status: RESOLVED | Noted: 2024-05-17 | Resolved: 2024-12-06

## 2024-12-06 PROCEDURE — 90472 IMMUNIZATION ADMIN EACH ADD: CPT | Performed by: INTERNAL MEDICINE

## 2024-12-06 PROCEDURE — 3080F DIAST BP >= 90 MM HG: CPT | Performed by: INTERNAL MEDICINE

## 2024-12-06 PROCEDURE — 90471 IMMUNIZATION ADMIN: CPT | Performed by: INTERNAL MEDICINE

## 2024-12-06 PROCEDURE — 3008F BODY MASS INDEX DOCD: CPT | Performed by: INTERNAL MEDICINE

## 2024-12-06 PROCEDURE — 99212 OFFICE O/P EST SF 10 MIN: CPT | Performed by: INTERNAL MEDICINE

## 2024-12-06 PROCEDURE — 90739 HEPB VACC 2/4 DOSE ADULT IM: CPT | Performed by: INTERNAL MEDICINE

## 2024-12-06 PROCEDURE — 3077F SYST BP >= 140 MM HG: CPT | Performed by: INTERNAL MEDICINE

## 2024-12-06 PROCEDURE — 90632 HEPA VACCINE ADULT IM: CPT | Performed by: INTERNAL MEDICINE

## 2024-12-06 RX ORDER — LOSARTAN POTASSIUM AND HYDROCHLOROTHIAZIDE 12.5; 1 MG/1; MG/1
1 TABLET ORAL DAILY
Qty: 90 TABLET | Refills: 3 | Status: SHIPPED | OUTPATIENT
Start: 2024-12-06

## 2024-12-06 RX ORDER — TIRZEPATIDE 15 MG/.5ML
15 INJECTION, SOLUTION SUBCUTANEOUS
Qty: 2 ML | Refills: 2 | Status: SHIPPED | OUTPATIENT
Start: 2024-12-06 | End: 2024-12-15 | Stop reason: SDUPTHER

## 2024-12-06 ASSESSMENT — PAIN SCALES - GENERAL: PAINLEVEL_OUTOF10: 0-NO PAIN

## 2024-12-06 NOTE — PROGRESS NOTES
"Subjective   Chuck Dunn \"Toni\" is a 44 y.o. male who presents for Follow-up (Medication ).    Patient is due for his second hepatitis A and B vaccine  Patient is down 14 pounds since September   Patient states that he is doing well on the dosage of zepbound and no issues or concerns   Patient was wondering with his diet changes and weight loss if there is possibilities of getting off his BP medication         Review of Systems   All other systems reviewed and are negative.      Objective   BP (!) 142/98   Pulse 78   Temp 36.8 °C (98.2 °F)   Resp 16   Ht 1.803 m (5' 11\")   Wt 132 kg (290 lb)   SpO2 97%   BMI 40.45 kg/m²       Physical Exam  Constitutional:       Appearance: Normal appearance.   HENT:      Head: Normocephalic.   Eyes:      Conjunctiva/sclera: Conjunctivae normal.   Cardiovascular:      Rate and Rhythm: Normal rate and regular rhythm.      Heart sounds: Normal heart sounds.   Pulmonary:      Effort: Pulmonary effort is normal.      Breath sounds: Normal breath sounds.   Musculoskeletal:      Cervical back: Neck supple.   Skin:     General: Skin is warm and dry.   Neurological:      Mental Status: He is alert.   Psychiatric:         Mood and Affect: Mood normal.         Assessment & Plan  Immunization due    Orders:    Hepatitis B vaccine, 18yrs and older (HEPLISAV)    Hepatitis A vaccine, age 19 years and greater (HAVRIX)    Class 3 severe obesity due to excess calories without serious comorbidity with body mass index (BMI) of 45.0 to 49.9 in adult  Started at 323.  Continues with diet and exercise, and Zepbound    Orders:    tirzepatide, weight loss, (Zepbound) 15 mg/0.5 mL injection; Inject 15 mg under the skin every 7 days.    Primary hypertension    Orders:    losartan-hydrochlorothiazide (Hyzaar) 100-12.5 mg tablet; Take 1 tablet by mouth once daily.         Willy Calzada, DO   "

## 2024-12-06 NOTE — ASSESSMENT & PLAN NOTE
Started at 323.  Continues with diet and exercise, and Zepbound    Orders:    tirzepatide, weight loss, (Zepbound) 15 mg/0.5 mL injection; Inject 15 mg under the skin every 7 days.

## 2024-12-06 NOTE — ASSESSMENT & PLAN NOTE
Orders:    losartan-hydrochlorothiazide (Hyzaar) 100-12.5 mg tablet; Take 1 tablet by mouth once daily.

## 2024-12-11 DIAGNOSIS — E66.01 CLASS 3 SEVERE OBESITY DUE TO EXCESS CALORIES WITHOUT SERIOUS COMORBIDITY WITH BODY MASS INDEX (BMI) OF 45.0 TO 49.9 IN ADULT: ICD-10-CM

## 2024-12-11 DIAGNOSIS — E66.813 CLASS 3 SEVERE OBESITY DUE TO EXCESS CALORIES WITHOUT SERIOUS COMORBIDITY WITH BODY MASS INDEX (BMI) OF 45.0 TO 49.9 IN ADULT: ICD-10-CM

## 2024-12-11 DIAGNOSIS — I10 PRIMARY HYPERTENSION: ICD-10-CM

## 2024-12-12 RX ORDER — TIRZEPATIDE 15 MG/.5ML
15 INJECTION, SOLUTION SUBCUTANEOUS
Qty: 2 ML | Refills: 2 | OUTPATIENT
Start: 2024-12-12

## 2024-12-12 RX ORDER — LOSARTAN POTASSIUM AND HYDROCHLOROTHIAZIDE 12.5; 1 MG/1; MG/1
1 TABLET ORAL DAILY
Qty: 90 TABLET | Refills: 3 | OUTPATIENT
Start: 2024-12-12

## 2024-12-17 ENCOUNTER — LAB REQUISITION (OUTPATIENT)
Dept: LAB | Facility: HOSPITAL | Age: 44
End: 2024-12-17
Payer: COMMERCIAL

## 2024-12-17 PROCEDURE — 88300 SURGICAL PATH GROSS: CPT

## 2024-12-17 PROCEDURE — 88311 DECALCIFY TISSUE: CPT

## 2024-12-17 PROCEDURE — 88305 TISSUE EXAM BY PATHOLOGIST: CPT

## 2024-12-17 PROCEDURE — 88305 TISSUE EXAM BY PATHOLOGIST: CPT | Performed by: STUDENT IN AN ORGANIZED HEALTH CARE EDUCATION/TRAINING PROGRAM

## 2024-12-17 PROCEDURE — 88311 DECALCIFY TISSUE: CPT | Performed by: STUDENT IN AN ORGANIZED HEALTH CARE EDUCATION/TRAINING PROGRAM

## 2024-12-26 ENCOUNTER — APPOINTMENT (OUTPATIENT)
Dept: PHARMACY | Facility: HOSPITAL | Age: 44
End: 2024-12-26
Payer: COMMERCIAL

## 2024-12-26 DIAGNOSIS — E66.813 CLASS 3 SEVERE OBESITY DUE TO EXCESS CALORIES WITHOUT SERIOUS COMORBIDITY WITH BODY MASS INDEX (BMI) OF 45.0 TO 49.9 IN ADULT: ICD-10-CM

## 2024-12-26 DIAGNOSIS — E66.01 CLASS 3 SEVERE OBESITY DUE TO EXCESS CALORIES WITHOUT SERIOUS COMORBIDITY WITH BODY MASS INDEX (BMI) OF 45.0 TO 49.9 IN ADULT: ICD-10-CM

## 2024-12-26 NOTE — PROGRESS NOTES
"  Clinical Pharmacy Appointment    Patient ID: Chuck Dunn \"Joelle" is a 44 y.o. male who presents for Obesity.    Pt is here for Follow Up appointment.   Referring Provider: Willy Calzada DO  PCP: Willy Calzada DO, last visit: 12/6/24, next visit: 3/7/25    Subjective   HPI  PMH significant for Obesity, acute appendicitis s/p appendectomy.  Special needs/barriers to therapy: None identified    Drug Interactions  No relevant drug interactions were noted.    Medication System Management  Patients preferred pharmacy: [facility]  Adherence/Organization: No current concerns  Affordability/Accessibility: No current concerns    WEIGHT LOSS  BMI Readings from Last 1 Encounters:   12/06/24 40.45 kg/m²   Starting weight: 323 lbs. (5/3/24)   Previous weight: 280 lbs. (11/27/24)  Current weight: 278 lbs.    Lifestyle  Diet: Ketogenic diet. Has cut out diet pop. However, having more carbs than usual due to recent wisdom teeth extraction and limited to soft foods.  Has worked with nutritionist/dietician? Yes, currently following  Physical Activity: Trying to increase, limited by frequent travel for work    Non-Pharmacological Therapy  Weight loss techniques attempted:  Surgical intervention: Sleeve gastrectomy, 2017    Pharmacological Therapy  Current Medications:   Zepbound 15mg once weekly (Sundays)   Previous Medications: None     Clarifications to above regimen: None  Adverse Effects: None      Objective   Allergies   Allergen Reactions    Bee Venom Protein (Honey Bee) Swelling    Venom-Wasp Swelling     Social History     Social History Narrative    Not on file      Medication Review  Current Outpatient Medications   Medication Instructions    CALCIUM-MAG OXIDE-VITAMIN D3 ORAL 1 tablet, 2 times daily    losartan-hydrochlorothiazide (Hyzaar) 100-12.5 mg tablet 1 tablet, oral, Daily    multivitamin tablet 1 tablet, Daily    omega 3-dha-epa-fish oil (Fish OiL) 1,000 mg (120 mg-180 mg) capsule 1 " capsule, Every evening    omeprazole OTC (PRILOSEC OTC) 20 mg, 2 times daily before meals    Zepbound 15 mg, subcutaneous, Every 7 days      Vitals  BP Readings from Last 2 Encounters:   12/06/24 (!) 142/98   09/06/24 112/80     BMI Readings from Last 1 Encounters:   12/06/24 40.45 kg/m²      Labs  A1C  Lab Results   Component Value Date    HGBA1C 5.6 10/27/2021     BMP  Lab Results   Component Value Date    CALCIUM 9.9 06/20/2024     06/20/2024    K 4.4 06/20/2024    CO2 28 06/20/2024     06/20/2024    BUN 14 06/20/2024    CREATININE 0.87 06/20/2024    EGFR >90 06/20/2024     LFTs  Lab Results   Component Value Date    ALT 39 06/20/2024    AST 25 06/20/2024    ALKPHOS 40 06/20/2024    BILITOT 0.6 06/20/2024     FLP  Lab Results   Component Value Date    TRIG 86 06/20/2024    CHOL 204 (H) 06/20/2024    LDLF 131 (H) 03/27/2023    LDLCALC 141 (H) 06/20/2024    HDL 46.3 06/20/2024     Urine Microalbumin  Lab Results   Component Value Date    MICROALBCREA 7.3 06/20/2024     Weight Management  Wt Readings from Last 3 Encounters:   12/06/24 132 kg (290 lb)   09/06/24 138 kg (304 lb 3.2 oz)   06/18/24 141 kg (311 lb 11.2 oz)      There is no height or weight on file to calculate BMI.     Assessment/Plan   Problem List Items Addressed This Visit       Class 3 severe obesity due to excess calories without serious comorbidity with body mass index (BMI) of 45.0 to 49.9 in adult     Patient's current weight reported as 278 lbs. Has lost 45 lbs. (13.9% of TBW) since starting therapy plan.  Current regimen includes: Zepbound 15mg weekly  Rationale for plan: Patient doing well on current dose of Zepbound, no current concerns. Has stopped having diet pop as discuss. However, has been having more carbs than usual recently due to wisdom tooth removal and being limited to soft foods. Plan to continue current regimen and follow-up in 4 weeks.    Medication Changes:  CONTINUE  Zepbound 15mg once weekly         Relevant  Orders    Referral to Clinical Pharmacy     Monitoring and Education:  Counseled patient on relevant MOA, expectations, side effects, duration of therapy, contraindications, administration, and monitoring parameters.  All questions and concerns addressed. Contact pharmacist with any further questions or concerns prior to next appointment.     Clinical Pharmacist follow-up: 1/23/25 at 3:20pm, Telehealth visit    Continue all meds under the continuation of care with the referring provider and clinical pharmacy team.    Thank you,  Betty Caruso, PharmD  Clinical Pharmacist  [phone]    Verbal consent to manage patient's drug therapy was obtained from the patient. They were informed they may decline to participate or withdraw from participation in pharmacy services at any time.

## 2024-12-26 NOTE — ASSESSMENT & PLAN NOTE
Patient's current weight reported as 278 lbs. Has lost 45 lbs. (13.9% of TBW) since starting therapy plan.  Current regimen includes: Zepbound 15mg weekly  Rationale for plan: Patient doing well on current dose of Zepbound, no current concerns. Has stopped having diet pop as discuss. However, has been having more carbs than usual recently due to wisdom tooth removal and being limited to soft foods. Plan to continue current regimen and follow-up in 4 weeks.    Medication Changes:  CONTINUE  Zepbound 15mg once weekly

## 2024-12-28 PROCEDURE — RXMED WILLOW AMBULATORY MEDICATION CHARGE

## 2025-01-04 ENCOUNTER — PHARMACY VISIT (OUTPATIENT)
Dept: PHARMACY | Facility: CLINIC | Age: 45
End: 2025-01-04
Payer: COMMERCIAL

## 2025-01-23 ENCOUNTER — APPOINTMENT (OUTPATIENT)
Dept: PHARMACY | Facility: HOSPITAL | Age: 45
End: 2025-01-23
Payer: COMMERCIAL

## 2025-01-23 DIAGNOSIS — E66.01 CLASS 3 SEVERE OBESITY DUE TO EXCESS CALORIES WITHOUT SERIOUS COMORBIDITY WITH BODY MASS INDEX (BMI) OF 45.0 TO 49.9 IN ADULT: ICD-10-CM

## 2025-01-23 DIAGNOSIS — E66.813 CLASS 3 SEVERE OBESITY DUE TO EXCESS CALORIES WITHOUT SERIOUS COMORBIDITY WITH BODY MASS INDEX (BMI) OF 45.0 TO 49.9 IN ADULT: ICD-10-CM

## 2025-01-23 PROCEDURE — RXMED WILLOW AMBULATORY MEDICATION CHARGE

## 2025-01-23 RX ORDER — TIRZEPATIDE 15 MG/.5ML
15 INJECTION, SOLUTION SUBCUTANEOUS
Qty: 2 ML | Refills: 2 | Status: SHIPPED | OUTPATIENT
Start: 2025-01-23 | End: 2025-04-17

## 2025-01-23 NOTE — ASSESSMENT & PLAN NOTE
Patient's current weight reported as 275 lbs. Has lost 48 lbs. (14.9% of TBW) since starting therapy plan.  Current regimen includes: Zepbound 15mg weekly  Rationale for plan: Patient tolerating current regimen well. Continuing to lose weight at appropriate rate, no current concerns. Getting back to solid foods following wisdom teeth removal. Plan to continue current regimen and follow-up in 1 month.    Medication Changes:  CONTINUE  Zepbound 15mg once weekly

## 2025-01-23 NOTE — PROGRESS NOTES
"  Clinical Pharmacy Appointment    Patient ID: Chuck Dunn \"Joelle" is a 44 y.o. male who presents for Obesity.    Pt is here for Follow Up appointment.   Referring Provider: Willy Calzada DO  PCP: Willy Calzada DO, last visit: 12/6/24, next visit: 3/7/25    Subjective   HPI  PMH significant for Obesity, acute appendicitis s/p appendectomy.  Special needs/barriers to therapy: None identified    Drug Interactions  No relevant drug interactions were noted.    Medication System Management  Patients preferred pharmacy: [facility]  Adherence/Organization: No current concerns  Affordability/Accessibility: No current concerns    WEIGHT LOSS  BMI Readings from Last 1 Encounters:   12/06/24 40.45 kg/m²   Starting weight: 323 lbs. (5/3/24)   Previous weight: 278 lbs. (12/26/24)  Current weight: 275 lbs.    Lifestyle  Diet: Ketogenic diet. Has cut out diet pop. Getting back to more solid foods since wisdom teeth removal.  Has worked with nutritionist/dietician? Yes, currently following  Physical Activity: Trying to increase, limited by frequent travel for work    Non-Pharmacological Therapy  Weight loss techniques attempted:  Surgical intervention: Sleeve gastrectomy, 2017    Pharmacological Therapy  Current Medications:   Zepbound 15mg once weekly (Sundays)   Previous Medications: None    Clarifications to above regimen: None  Adverse Effects: None      Objective   Allergies   Allergen Reactions    Bee Venom Protein (Honey Bee) Swelling    Venom-Wasp Swelling     Social History     Social History Narrative    Not on file      Medication Review  Current Outpatient Medications   Medication Instructions    CALCIUM-MAG OXIDE-VITAMIN D3 ORAL 1 tablet, 2 times daily    losartan-hydrochlorothiazide (Hyzaar) 100-12.5 mg tablet 1 tablet, oral, Daily    multivitamin tablet 1 tablet, Daily    omega 3-dha-epa-fish oil (Fish OiL) 1,000 mg (120 mg-180 mg) capsule 1 capsule, Every evening    omeprazole OTC " (PRILOSEC OTC) 20 mg, 2 times daily before meals    Zepbound 15 mg, subcutaneous, Every 7 days      Vitals  BP Readings from Last 2 Encounters:   12/06/24 (!) 142/98   09/06/24 112/80     BMI Readings from Last 1 Encounters:   12/06/24 40.45 kg/m²      Labs  A1C  Lab Results   Component Value Date    HGBA1C 5.6 10/27/2021     BMP  Lab Results   Component Value Date    CALCIUM 9.9 06/20/2024     06/20/2024    K 4.4 06/20/2024    CO2 28 06/20/2024     06/20/2024    BUN 14 06/20/2024    CREATININE 0.87 06/20/2024    EGFR >90 06/20/2024     LFTs  Lab Results   Component Value Date    ALT 39 06/20/2024    AST 25 06/20/2024    ALKPHOS 40 06/20/2024    BILITOT 0.6 06/20/2024     FLP  Lab Results   Component Value Date    TRIG 86 06/20/2024    CHOL 204 (H) 06/20/2024    LDLF 131 (H) 03/27/2023    LDLCALC 141 (H) 06/20/2024    HDL 46.3 06/20/2024     Urine Microalbumin  Lab Results   Component Value Date    MICROALBCREA 7.3 06/20/2024     Weight Management  Wt Readings from Last 3 Encounters:   12/06/24 132 kg (290 lb)   09/06/24 138 kg (304 lb 3.2 oz)   06/18/24 141 kg (311 lb 11.2 oz)      There is no height or weight on file to calculate BMI.     Assessment/Plan   Problem List Items Addressed This Visit       Class 3 severe obesity due to excess calories without serious comorbidity with body mass index (BMI) of 45.0 to 49.9 in adult     Patient's current weight reported as 275 lbs. Has lost 48 lbs. (14.9% of TBW) since starting therapy plan.  Current regimen includes: Zepbound 15mg weekly  Rationale for plan: Patient tolerating current regimen well. Continuing to lose weight at appropriate rate, no current concerns. Getting back to solid foods following wisdom teeth removal. Plan to continue current regimen and follow-up in 1 month.    Medication Changes:  CONTINUE  Zepbound 15mg once weekly         Relevant Medications    tirzepatide, weight loss, (Zepbound) 15 mg/0.5 mL injection    Other Relevant Orders     Referral to Clinical Pharmacy     Monitoring and Education:  Counseled patient on relevant MOA, expectations, side effects, duration of therapy, contraindications, administration, and monitoring parameters.  All questions and concerns addressed. Contact pharmacist with any further questions or concerns prior to next appointment.     Clinical Pharmacist follow-up: 2/20/25 at 3:20pm, Telehealth visit    Continue all meds under the continuation of care with the referring provider and clinical pharmacy team.    Thank you,  Betty Caruso, PharmD  Clinical Pharmacist  [phone]    Verbal consent to manage patient's drug therapy was obtained from the patient. They were informed they may decline to participate or withdraw from participation in pharmacy services at any time.

## 2025-01-25 ENCOUNTER — PHARMACY VISIT (OUTPATIENT)
Dept: PHARMACY | Facility: CLINIC | Age: 45
End: 2025-01-25
Payer: COMMERCIAL

## 2025-02-19 PROCEDURE — RXMED WILLOW AMBULATORY MEDICATION CHARGE

## 2025-02-20 ENCOUNTER — APPOINTMENT (OUTPATIENT)
Dept: PHARMACY | Facility: HOSPITAL | Age: 45
End: 2025-02-20
Payer: COMMERCIAL

## 2025-02-20 DIAGNOSIS — E66.01 CLASS 3 SEVERE OBESITY DUE TO EXCESS CALORIES WITHOUT SERIOUS COMORBIDITY WITH BODY MASS INDEX (BMI) OF 45.0 TO 49.9 IN ADULT: ICD-10-CM

## 2025-02-20 DIAGNOSIS — E66.813 CLASS 3 SEVERE OBESITY DUE TO EXCESS CALORIES WITHOUT SERIOUS COMORBIDITY WITH BODY MASS INDEX (BMI) OF 45.0 TO 49.9 IN ADULT: ICD-10-CM

## 2025-02-20 NOTE — PROGRESS NOTES
"  Clinical Pharmacy Appointment    Patient ID: Chuck Dunn \"Joelle" is a 44 y.o. male who presents for Obesity.    Pt is here for Follow Up appointment.   Referring Provider: Willy Calzada DO  PCP: Willy Calzada DO, last visit: 12/6/24, next visit: 3/7/25    Subjective   HPI  PMH significant for Obesity, acute appendicitis s/p appendectomy.  Special needs/barriers to therapy: None identified    Drug Interactions  No relevant drug interactions were noted.    Medication System Management  Patients preferred pharmacy: [facility]  Adherence/Organization: No current concerns  Affordability/Accessibility: No current concerns    WEIGHT LOSS  BMI Readings from Last 1 Encounters:   12/06/24 40.45 kg/m²   Starting weight: 323 lbs. (5/3/24)   Previous weight: 275 lbs. (1/23/25)  Current weight: 268 lbs.    Weight Goals  BMI Goal: 18.5 to 24.9  Weight Goal: 133 to 178 lbs.  Patient defined goal(s): 235 to 245 lbs.    Lifestyle  Diet: Ketogenic diet. No recent changes.  Has worked with nutritionist/dietician? Yes, currently following  Physical Activity: Trying to increase, limited by frequent travel for work    Non-Pharmacological Therapy  Weight loss techniques attempted:  Surgical intervention: Sleeve gastrectomy, 2017    Pharmacological Therapy  Current Medications:   Zepbound 15mg once weekly (Sundays)   Previous Medications: None    Clarifications to above regimen: None  Adverse Effects: None      Objective   Allergies   Allergen Reactions    Bee Venom Protein (Honey Bee) Swelling    Venom-Wasp Swelling     Social History     Social History Narrative    Not on file      Medication Review  Current Outpatient Medications   Medication Instructions    CALCIUM-MAG OXIDE-VITAMIN D3 ORAL 1 tablet, 2 times daily    losartan-hydrochlorothiazide (Hyzaar) 100-12.5 mg tablet 1 tablet, oral, Daily    multivitamin tablet 1 tablet, Daily    omega 3-dha-epa-fish oil (Fish OiL) 1,000 mg (120 mg-180 mg) capsule 1 " capsule, Every evening    omeprazole OTC (PRILOSEC OTC) 20 mg, 2 times daily before meals    Zepbound 15 mg, subcutaneous, Every 7 days      Vitals  BP Readings from Last 2 Encounters:   12/06/24 (!) 142/98   09/06/24 112/80     BMI Readings from Last 1 Encounters:   12/06/24 40.45 kg/m²      Labs  A1C  Lab Results   Component Value Date    HGBA1C 5.6 10/27/2021     BMP  Lab Results   Component Value Date    CALCIUM 9.9 06/20/2024     06/20/2024    K 4.4 06/20/2024    CO2 28 06/20/2024     06/20/2024    BUN 14 06/20/2024    CREATININE 0.87 06/20/2024    EGFR >90 06/20/2024     LFTs  Lab Results   Component Value Date    ALT 39 06/20/2024    AST 25 06/20/2024    ALKPHOS 40 06/20/2024    BILITOT 0.6 06/20/2024     FLP  Lab Results   Component Value Date    TRIG 86 06/20/2024    CHOL 204 (H) 06/20/2024    LDLF 131 (H) 03/27/2023    LDLCALC 141 (H) 06/20/2024    HDL 46.3 06/20/2024     Urine Microalbumin  Lab Results   Component Value Date    MICROALBCREA 7.3 06/20/2024     Weight Management  Wt Readings from Last 3 Encounters:   12/06/24 132 kg (290 lb)   09/06/24 138 kg (304 lb 3.2 oz)   06/18/24 141 kg (311 lb 11.2 oz)      There is no height or weight on file to calculate BMI.     Assessment/Plan   Problem List Items Addressed This Visit       Class 3 severe obesity due to excess calories without serious comorbidity with body mass index (BMI) of 45.0 to 49.9 in adult     Patient's current weight reported as 268 lbs. Has lost 55 lbs. (17% of TBW) since starting therapy plan.  Current regimen includes: Zepbound 15mg once weekly  Rationale for plan: patient tolerating current regimen well, no recent changes. Due to indication for further weight loss, plan to continue current regimen. Patient received notification from insurance that enrollment in [facility] will be required for continued coverage of Zepbound. Patient to enroll and participate in health coaching program and continue medication  management with pharmacist.     Medication Changes:  CONTINUE  Zepbound 15mg once weekly         Relevant Orders    Referral to Clinical Pharmacy     Monitoring and Education:  Counseled patient on relevant MOA, expectations, side effects, duration of therapy, contraindications, administration, and monitoring parameters.  All questions and concerns addressed. Contact pharmacist with any further questions or concerns prior to next appointment.     Clinical Pharmacist follow-up: 3/20/25 at 3:20pm, Telehealth visit  Patient is not followed in [facility].     Thank you,  Betty Caruso, [facility]  Clinical Pharmacist  [phone]    Continue all meds under the continuation of care with the referring provider and clinical pharmacy team.  Verbal consent to manage patient's drug therapy was obtained from the patient. They were informed they may decline to participate or withdraw from participation in pharmacy services at any time.

## 2025-02-21 NOTE — ASSESSMENT & PLAN NOTE
Patient's current weight reported as 268 lbs. Has lost 55 lbs. (17% of TBW) since starting therapy plan.  Current regimen includes: Zepbound 15mg once weekly  Rationale for plan: patient tolerating current regimen well, no recent changes. Due to indication for further weight loss, plan to continue current regimen. Patient received notification from insurance that enrollment in [facility] will be required for continued coverage of Zepbound. Patient to enroll and participate in health coaching program and continue medication management with pharmacist.     Medication Changes:  CONTINUE  Zepbound 15mg once weekly

## 2025-02-22 ENCOUNTER — PHARMACY VISIT (OUTPATIENT)
Dept: PHARMACY | Facility: CLINIC | Age: 45
End: 2025-02-22
Payer: COMMERCIAL

## 2025-03-07 ENCOUNTER — APPOINTMENT (OUTPATIENT)
Dept: PRIMARY CARE | Facility: CLINIC | Age: 45
End: 2025-03-07
Payer: COMMERCIAL

## 2025-03-07 VITALS
DIASTOLIC BLOOD PRESSURE: 74 MMHG | HEIGHT: 71 IN | HEART RATE: 106 BPM | SYSTOLIC BLOOD PRESSURE: 128 MMHG | RESPIRATION RATE: 16 BRPM | BODY MASS INDEX: 37.91 KG/M2 | OXYGEN SATURATION: 98 % | WEIGHT: 270.8 LBS

## 2025-03-07 DIAGNOSIS — I10 PRIMARY HYPERTENSION: ICD-10-CM

## 2025-03-07 DIAGNOSIS — R00.0 TACHYCARDIA: Primary | ICD-10-CM

## 2025-03-07 DIAGNOSIS — Z00.00 WELL ADULT HEALTH CHECK: ICD-10-CM

## 2025-03-07 DIAGNOSIS — Z12.5 ENCOUNTER FOR PROSTATE CANCER SCREENING: ICD-10-CM

## 2025-03-07 PROCEDURE — 1036F TOBACCO NON-USER: CPT | Performed by: INTERNAL MEDICINE

## 2025-03-07 PROCEDURE — 3008F BODY MASS INDEX DOCD: CPT | Performed by: INTERNAL MEDICINE

## 2025-03-07 PROCEDURE — 99214 OFFICE O/P EST MOD 30 MIN: CPT | Performed by: INTERNAL MEDICINE

## 2025-03-07 PROCEDURE — RXMED WILLOW AMBULATORY MEDICATION CHARGE

## 2025-03-07 PROCEDURE — 3074F SYST BP LT 130 MM HG: CPT | Performed by: INTERNAL MEDICINE

## 2025-03-07 PROCEDURE — 3078F DIAST BP <80 MM HG: CPT | Performed by: INTERNAL MEDICINE

## 2025-03-07 RX ORDER — LOSARTAN POTASSIUM 100 MG/1
100 TABLET ORAL DAILY
Qty: 100 TABLET | Refills: 3 | Status: SHIPPED | OUTPATIENT
Start: 2025-03-07 | End: 2026-04-11

## 2025-03-07 ASSESSMENT — ENCOUNTER SYMPTOMS
SHORTNESS OF BREATH: 0
CHILLS: 0
ABDOMINAL PAIN: 0
TROUBLE SWALLOWING: 0
HYPERTENSION: 1
SORE THROAT: 0
PALPITATIONS: 0
FEVER: 0

## 2025-03-07 ASSESSMENT — PATIENT HEALTH QUESTIONNAIRE - PHQ9
2. FEELING DOWN, DEPRESSED OR HOPELESS: NOT AT ALL
SUM OF ALL RESPONSES TO PHQ9 QUESTIONS 1 AND 2: 0
1. LITTLE INTEREST OR PLEASURE IN DOING THINGS: NOT AT ALL

## 2025-03-07 ASSESSMENT — PAIN SCALES - GENERAL: PAINLEVEL_OUTOF10: 0-NO PAIN

## 2025-03-07 NOTE — PROGRESS NOTES
"Ena Dunn \"Toni\" is a 44 y.o. male who presents for Follow-up (medication) and Hypertension.      Patient has lost 20 lbs since last visit, and a total of 53 pounds since starting Zepbound in June 2024   Has not had any issues with Zepbound, has been using fiber and stool softner every other day to help with constipation   Patient checks BP at home with his watch, and readings are around 120s/60s   Patient states that he has a pretty decent size hemorrhoids awhile ago, is getting better but states that he is unsure why or how he got it due to not having any straining while having BM's and has not had any unusual BM's   Did not notice any blood in stools and it is improving on its own.    Has been doing low carb/ carnivore diet.  Taking less fiber than before.      Does not drink much fluid/water.          Hypertension  This is a recurrent problem. The current episode started more than 1 year ago. The problem has been gradually improving since onset. The problem is controlled. Pertinent negatives include no chest pain, palpitations or shortness of breath. There are no associated agents to hypertension. Risk factors for coronary artery disease include male gender and obesity. Past treatments include angiotensin blockers and diuretics. The current treatment provides moderate improvement. There are no compliance problems.        Review of Systems   Constitutional:  Negative for chills and fever.   HENT:  Negative for sore throat and trouble swallowing.    Respiratory:  Negative for shortness of breath.    Cardiovascular:  Negative for chest pain, palpitations and leg swelling.   Gastrointestinal:  Negative for abdominal pain.   Skin:  Negative for rash.   All other systems reviewed and are negative.      Objective   /74   Pulse 106   Resp 16   Ht 1.803 m (5' 11\")   Wt 123 kg (270 lb 12.8 oz)   SpO2 98%   BMI 37.77 kg/m²       Physical Exam  Constitutional:       Appearance: Normal " appearance.   HENT:      Head: Normocephalic.   Eyes:      Conjunctiva/sclera: Conjunctivae normal.   Cardiovascular:      Rate and Rhythm: Regular rhythm. Tachycardia present.      Heart sounds: Normal heart sounds.   Pulmonary:      Effort: Pulmonary effort is normal.      Breath sounds: Normal breath sounds.   Musculoskeletal:      Cervical back: Neck supple.   Skin:     General: Skin is warm and dry.   Neurological:      Mental Status: He is alert.         Assessment & Plan  Tachycardia    Orders:    Magnesium; Future    CBC; Future    Primary hypertension    Orders:    losartan (Cozaar) 100 mg tablet; Take 1 tablet (100 mg) by mouth once daily.    Albumin-Creatinine Ratio, Urine Random; Future    Well adult health check    Orders:    Comprehensive Metabolic Panel; Future    Lipid Panel; Future    Prostate Specific Antigen; Future    TSH with reflex to Free T4 if abnormal; Future    Albumin-Creatinine Ratio, Urine Random; Future    Nicotine screen, urine; Future    Encounter for prostate cancer screening    Orders:    Prostate Specific Antigen; Future         Willy Calzada DO

## 2025-03-07 NOTE — ASSESSMENT & PLAN NOTE
Orders:    Comprehensive Metabolic Panel; Future    Lipid Panel; Future    Prostate Specific Antigen; Future    TSH with reflex to Free T4 if abnormal; Future    Albumin-Creatinine Ratio, Urine Random; Future    Nicotine screen, urine; Future

## 2025-03-07 NOTE — ASSESSMENT & PLAN NOTE
Orders:    losartan (Cozaar) 100 mg tablet; Take 1 tablet (100 mg) by mouth once daily.    Albumin-Creatinine Ratio, Urine Random; Future

## 2025-03-08 ENCOUNTER — PHARMACY VISIT (OUTPATIENT)
Dept: PHARMACY | Facility: CLINIC | Age: 45
End: 2025-03-08
Payer: COMMERCIAL

## 2025-03-08 LAB
ALBUMIN SERPL-MCNC: 5.1 G/DL (ref 3.6–5.1)
ALP SERPL-CCNC: 33 U/L (ref 36–130)
ALT SERPL-CCNC: 20 U/L (ref 9–46)
ANION GAP SERPL CALCULATED.4IONS-SCNC: 13 MMOL/L (CALC) (ref 7–17)
AST SERPL-CCNC: 17 U/L (ref 10–40)
BILIRUB SERPL-MCNC: 0.5 MG/DL (ref 0.2–1.2)
BUN SERPL-MCNC: 17 MG/DL (ref 7–25)
CALCIUM SERPL-MCNC: 10.6 MG/DL (ref 8.6–10.3)
CHLORIDE SERPL-SCNC: 102 MMOL/L (ref 98–110)
CHOLEST SERPL-MCNC: 229 MG/DL
CHOLEST/HDLC SERPL: 3.9 (CALC)
CO2 SERPL-SCNC: 27 MMOL/L (ref 20–32)
CREAT SERPL-MCNC: 0.73 MG/DL (ref 0.6–1.29)
EGFRCR SERPLBLD CKD-EPI 2021: 116 ML/MIN/1.73M2
ERYTHROCYTE [DISTWIDTH] IN BLOOD BY AUTOMATED COUNT: 13.5 % (ref 11–15)
GLUCOSE SERPL-MCNC: 79 MG/DL (ref 65–99)
HCT VFR BLD AUTO: 43.8 % (ref 38.5–50)
HDLC SERPL-MCNC: 58 MG/DL
HGB BLD-MCNC: 14.3 G/DL (ref 13.2–17.1)
LDLC SERPL CALC-MCNC: 152 MG/DL (CALC)
MAGNESIUM SERPL-MCNC: 2.2 MG/DL (ref 1.5–2.5)
MCH RBC QN AUTO: 29.7 PG (ref 27–33)
MCHC RBC AUTO-ENTMCNC: 32.6 G/DL (ref 32–36)
MCV RBC AUTO: 91.1 FL (ref 80–100)
NONHDLC SERPL-MCNC: 171 MG/DL (CALC)
PLATELET # BLD AUTO: 296 THOUSAND/UL (ref 140–400)
PMV BLD REES-ECKER: 11.7 FL (ref 7.5–12.5)
POTASSIUM SERPL-SCNC: 4.8 MMOL/L (ref 3.5–5.3)
PROT SERPL-MCNC: 7.5 G/DL (ref 6.1–8.1)
PSA SERPL-MCNC: 0.65 NG/ML
RBC # BLD AUTO: 4.81 MILLION/UL (ref 4.2–5.8)
SODIUM SERPL-SCNC: 142 MMOL/L (ref 135–146)
TRIGL SERPL-MCNC: 82 MG/DL
TSH SERPL-ACNC: 1.05 MIU/L (ref 0.4–4.5)
WBC # BLD AUTO: 8.2 THOUSAND/UL (ref 3.8–10.8)

## 2025-03-20 ENCOUNTER — APPOINTMENT (OUTPATIENT)
Dept: PHARMACY | Facility: HOSPITAL | Age: 45
End: 2025-03-20
Payer: COMMERCIAL

## 2025-03-20 DIAGNOSIS — E66.01 CLASS 3 SEVERE OBESITY DUE TO EXCESS CALORIES WITHOUT SERIOUS COMORBIDITY WITH BODY MASS INDEX (BMI) OF 45.0 TO 49.9 IN ADULT: ICD-10-CM

## 2025-03-20 DIAGNOSIS — E66.813 CLASS 3 SEVERE OBESITY DUE TO EXCESS CALORIES WITHOUT SERIOUS COMORBIDITY WITH BODY MASS INDEX (BMI) OF 45.0 TO 49.9 IN ADULT: ICD-10-CM

## 2025-03-20 PROCEDURE — RXMED WILLOW AMBULATORY MEDICATION CHARGE

## 2025-03-20 RX ORDER — TIRZEPATIDE 15 MG/.5ML
15 INJECTION, SOLUTION SUBCUTANEOUS
Qty: 2 ML | Refills: 3 | Status: SHIPPED | OUTPATIENT
Start: 2025-03-20

## 2025-03-22 ENCOUNTER — PHARMACY VISIT (OUTPATIENT)
Dept: PHARMACY | Facility: CLINIC | Age: 45
End: 2025-03-22
Payer: COMMERCIAL

## 2025-03-25 NOTE — PROGRESS NOTES
"  Clinical Pharmacy Appointment    Patient ID: Chuck Dunn \"Joelle" is a 44 y.o. male who presents for Obesity.    Pt is here for Follow Up appointment.   Referring Provider: Willy Calzada DO  PCP: Willy Calzada DO, last visit: 3/7/25, next visit: 5/2/25    Subjective   HPI  PMH significant for Obesity, acute appendicitis s/p appendectomy.  Special needs/barriers to therapy:  Use of Nev[employer] is required for insurance coverage of weight-loss medications.    Drug Interactions  No relevant drug interactions were noted.    Medication System Management  Patients preferred pharmacy: [email or web address]  Adherence/Organization: No current concerns  Affordability/Accessibility: No current concerns    WEIGHT LOSS  BMI Readings from Last 1 Encounters:   03/07/25 37.77 kg/m²   Starting weight: 323 lbs. (5/3/24)   Previous weight: 268 lbs. (2/20/25)  Current weight: 260 lbs.    Weight Goals  BMI Goal: 18.5 to 24.9  Weight Goal: 133 to 178 lbs.  Patient defined goal(s): 235 to 245 lbs.    Lifestyle  Diet: Ketogenic diet. Has added optifiber to coffee every morning to improve bowel movements and reduce hemorrhoids.  Physical Activity: Trying to increase, limited by frequent travel for work    Non-Pharmacological Therapy  Weight loss techniques attempted:  Surgical intervention: Sleeve gastrectomy, 2017    Pharmacological Therapy  Current Medications:   Zepbound 15mg once weekly (Sundays)   Previous Medications: None    Clarifications to above regimen: None  Adverse Effects: None      Objective   Allergies   Allergen Reactions    Bee Venom Protein (Honey Bee) Swelling    Venom-Wasp Swelling     Social History     Social History Narrative    Not on file      Medication Review  Current Outpatient Medications   Medication Instructions    ASHWAGANDHA EXTRACT ORAL Take by mouth.    CALCIUM-MAG OXIDE-VITAMIN D3 ORAL 1 tablet, 2 times daily    losartan (COZAAR) 100 mg, oral, Daily    multivitamin tablet 1 " tablet, Daily    omega 3-dha-epa-fish oil (Fish OiL) 1,000 mg (120 mg-180 mg) capsule 1 capsule, Every evening    omeprazole OTC (PRILOSEC OTC) 20 mg, 2 times daily before meals    Zepbound 15 mg, subcutaneous, Every 7 days      Vitals  BP Readings from Last 2 Encounters:   03/07/25 128/74   12/06/24 (!) 142/98     BMI Readings from Last 1 Encounters:   03/07/25 37.77 kg/m²      Labs  A1C  Lab Results   Component Value Date    HGBA1C 5.6 10/27/2021     BMP  Lab Results   Component Value Date    CALCIUM 10.6 (H) 03/07/2025     03/07/2025    K 4.8 03/07/2025    CO2 27 03/07/2025     03/07/2025    BUN 17 03/07/2025    CREATININE 0.73 03/07/2025    EGFR 116 03/07/2025     LFTs  Lab Results   Component Value Date    ALT 20 03/07/2025    AST 17 03/07/2025    ALKPHOS 33 (L) 03/07/2025    BILITOT 0.5 03/07/2025     FLP  Lab Results   Component Value Date    TRIG 82 03/07/2025    CHOL 229 (H) 03/07/2025    LDLF 131 (H) 03/27/2023    LDLCALC 152 (H) 03/07/2025    HDL 58 03/07/2025     Urine Microalbumin  Lab Results   Component Value Date    MICROALBCREA 7.3 06/20/2024     Weight Management  Wt Readings from Last 3 Encounters:   03/07/25 123 kg (270 lb 12.8 oz)   12/06/24 132 kg (290 lb)   09/06/24 138 kg (304 lb 3.2 oz)      There is no height or weight on file to calculate BMI.     Assessment/Plan   Problem List Items Addressed This Visit       Class 3 severe obesity due to excess calories without serious comorbidity with body mass index (BMI) of 45.0 to 49.9 in adult     Patient's current weight reported as 260 lbs. Has lost 63 lbs. (19.5% of TBW) since starting therapy plan.  Current regimen includes: Zepbound 15mg once weekly  Rationale for plan: Patient tolerating current regimen well. Recently experienced some hemorrhoids and has added optifiber supplement every morning. He has started using the [employer] dori to track weight and engage in learning content as required by insurance. Plan to continue  current regimen and follow-up in 3 months.    Medication Changes:  CONTINUE  Zepbound 15mg once weekly         Relevant Medications    tirzepatide, weight loss, (Zepbound) 15 mg/0.5 mL injection    Other Relevant Orders    Referral to Clinical Pharmacy     Monitoring and Education:  Counseled patient on relevant MOA, expectations, side effects, duration of therapy, contraindications, administration, and monitoring parameters.  All questions and concerns addressed. Contact pharmacist with any further questions or concerns prior to next appointment.     Clinical Pharmacist follow-up: 7/10/25 at 3:20pm, Telehealth visit  Patient is not followed in [facility].    Thank you,  Betty Caruso [facility]  Clinical Pharmacist  [phone]    Continue all meds under the continuation of care with the referring provider and clinical pharmacy team.  Verbal consent to manage patient's drug therapy was obtained from the patient. They were informed they may decline to participate or withdraw from participation in pharmacy services at any time.

## 2025-03-25 NOTE — ASSESSMENT & PLAN NOTE
Patient's current weight reported as 260 lbs. Has lost 63 lbs. (19.5% of TBW) since starting therapy plan.  Current regimen includes: Zepbound 15mg once weekly  Rationale for plan: Patient tolerating current regimen well. Recently experienced some hemorrhoids and has added optifiber supplement every morning. He has started using the [employer] dori to track weight and engage in learning content as required by insurance. Plan to continue current regimen and follow-up in 3 months.    Medication Changes:  CONTINUE  Zepbound 15mg once weekly

## 2025-03-31 PROCEDURE — RXMED WILLOW AMBULATORY MEDICATION CHARGE

## 2025-04-05 ENCOUNTER — PHARMACY VISIT (OUTPATIENT)
Dept: PHARMACY | Facility: CLINIC | Age: 45
End: 2025-04-05
Payer: COMMERCIAL

## 2025-04-13 PROCEDURE — RXMED WILLOW AMBULATORY MEDICATION CHARGE

## 2025-04-17 ENCOUNTER — PHARMACY VISIT (OUTPATIENT)
Dept: PHARMACY | Facility: CLINIC | Age: 45
End: 2025-04-17
Payer: COMMERCIAL

## 2025-04-25 DIAGNOSIS — I10 PRIMARY HYPERTENSION: ICD-10-CM

## 2025-04-28 RX ORDER — LOSARTAN POTASSIUM 100 MG/1
100 TABLET ORAL DAILY
Qty: 90 TABLET | Refills: 3 | Status: SHIPPED | OUTPATIENT
Start: 2025-04-28 | End: 2025-05-02 | Stop reason: SDUPTHER

## 2025-05-02 ENCOUNTER — APPOINTMENT (OUTPATIENT)
Dept: PRIMARY CARE | Facility: CLINIC | Age: 45
End: 2025-05-02
Payer: COMMERCIAL

## 2025-05-02 VITALS
SYSTOLIC BLOOD PRESSURE: 124 MMHG | HEART RATE: 96 BPM | OXYGEN SATURATION: 97 % | DIASTOLIC BLOOD PRESSURE: 86 MMHG | BODY MASS INDEX: 37.1 KG/M2 | RESPIRATION RATE: 16 BRPM | HEIGHT: 71 IN | WEIGHT: 265 LBS

## 2025-05-02 DIAGNOSIS — I10 PRIMARY HYPERTENSION: ICD-10-CM

## 2025-05-02 DIAGNOSIS — E66.812 CLASS 2 OBESITY DUE TO EXCESS CALORIES WITHOUT SERIOUS COMORBIDITY WITH BODY MASS INDEX (BMI) OF 36.0 TO 36.9 IN ADULT: ICD-10-CM

## 2025-05-02 DIAGNOSIS — Z00.00 WELL ADULT HEALTH CHECK: Primary | ICD-10-CM

## 2025-05-02 DIAGNOSIS — E66.09 CLASS 2 OBESITY DUE TO EXCESS CALORIES WITHOUT SERIOUS COMORBIDITY WITH BODY MASS INDEX (BMI) OF 36.0 TO 36.9 IN ADULT: ICD-10-CM

## 2025-05-02 DIAGNOSIS — E78.00 PURE HYPERCHOLESTEROLEMIA: ICD-10-CM

## 2025-05-02 PROCEDURE — 99396 PREV VISIT EST AGE 40-64: CPT | Performed by: INTERNAL MEDICINE

## 2025-05-02 PROCEDURE — 3074F SYST BP LT 130 MM HG: CPT | Performed by: INTERNAL MEDICINE

## 2025-05-02 PROCEDURE — 3079F DIAST BP 80-89 MM HG: CPT | Performed by: INTERNAL MEDICINE

## 2025-05-02 PROCEDURE — 3008F BODY MASS INDEX DOCD: CPT | Performed by: INTERNAL MEDICINE

## 2025-05-02 PROCEDURE — 1036F TOBACCO NON-USER: CPT | Performed by: INTERNAL MEDICINE

## 2025-05-02 RX ORDER — ROSUVASTATIN CALCIUM 5 MG/1
5 TABLET, COATED ORAL DAILY
Qty: 100 TABLET | Refills: 3 | Status: SHIPPED | OUTPATIENT
Start: 2025-05-02 | End: 2026-06-06

## 2025-05-02 RX ORDER — FERROUS SULFATE 325(65) MG
325 TABLET ORAL
COMMUNITY

## 2025-05-02 RX ORDER — GLUCOSAMINE/D3/HYALURONIC ACID 1000MG-25
TABLET ORAL
COMMUNITY

## 2025-05-02 RX ORDER — LOSARTAN POTASSIUM 100 MG/1
100 TABLET ORAL DAILY
Qty: 90 TABLET | Refills: 3 | Status: SHIPPED | OUTPATIENT
Start: 2025-05-02 | End: 2026-05-02

## 2025-05-02 RX ORDER — BUTYROSPERMUM PARKII(SHEA BUTTER), SIMMONDSIA CHINENSIS (JOJOBA) SEED OIL, ALOE BARBADENSIS LEAF EXTRACT .01; 1; 3.5 G/100G; G/100G; G/100G
250 LIQUID TOPICAL 2 TIMES DAILY
COMMUNITY

## 2025-05-02 RX ORDER — TIRZEPATIDE 15 MG/.5ML
15 INJECTION, SOLUTION SUBCUTANEOUS
Qty: 2 ML | Refills: 3 | Status: SHIPPED | OUTPATIENT
Start: 2025-05-02

## 2025-05-02 RX ORDER — IBUPROFEN 100 MG/5ML
1000 SUSPENSION, ORAL (FINAL DOSE FORM) ORAL DAILY
COMMUNITY

## 2025-05-02 ASSESSMENT — ENCOUNTER SYMPTOMS
FEVER: 0
ABDOMINAL PAIN: 0
SORE THROAT: 0
TROUBLE SWALLOWING: 0
SHORTNESS OF BREATH: 0
CHILLS: 0
PALPITATIONS: 0

## 2025-05-02 ASSESSMENT — PATIENT HEALTH QUESTIONNAIRE - PHQ9
SUM OF ALL RESPONSES TO PHQ9 QUESTIONS 1 AND 2: 0
1. LITTLE INTEREST OR PLEASURE IN DOING THINGS: NOT AT ALL
2. FEELING DOWN, DEPRESSED OR HOPELESS: NOT AT ALL

## 2025-05-02 ASSESSMENT — PAIN SCALES - GENERAL: PAINLEVEL_OUTOF10: 0-NO PAIN

## 2025-05-02 NOTE — PROGRESS NOTES
"Ena Dunn \"Toni\" is a 44 y.o. male who presents for Annual Exam.      Well Adult Physical   Patient here for a comprehensive physical exam.The patient reports no problems    Do you take any herbs or supplements that were not prescribed by a doctor? yes   Are you taking calcium supplements? yes   Are you taking aspirin daily? no     History:  Patient receives prostate care outside our clinic  Date last PSA: 3.7.2025      History of Present Illness  The patient presents for evaluation of hypercholesterolemia, elevated calcium levels, anxiety, and obesity.    Hypercholesterolemia  Cholesterol levels have been managed with CholestOff, an over-the-counter supplement, for the past 3 years. One tablet is taken in the morning and one in the evening. Despite this regimen, LDL levels remain high.  - Duration: Managed with CholestOff for the past 3 years.  - Alleviating/Aggravating Factors: CholestOff regimen (one tablet in the morning and one in the evening).  - Severity: LDL levels remain high.    Elevated Calcium Levels  Calcium supplements are taken twice daily, attributed to adherence to a keto-paleo diet. No kidney stones have been experienced to date. A recommendation to reduce the calcium supplement intake to once daily was discussed.  - Alleviating/Aggravating Factors: Calcium supplements taken twice daily.  - Severity: No kidney stones experienced.    Anxiety  Ashwagandha is utilized for stress and anxiety management.  - Alleviating/Aggravating Factors: Use of Ashwagandha for stress and anxiety management.    Obesity  Weight was recorded at 323 pounds in 05/2024, following an appendectomy. Significant weight loss has been achieved, with the current weight at 265 pounds. Efforts to further reduce weight continue, with a goal of reaching 225 pounds. The exercise routine includes calisthenics, stretching, push-ups, sit-ups, and hand weights. A high-protein diet is maintained, primarily " "consisting of fish, chicken, and steak.  - Onset: Weight recorded at 323 pounds in 05/2024, following an appendectomy.  - Duration: Significant weight loss achieved, current weight at 265 pounds.  - Alleviating/Aggravating Factors: Exercise routine includes calisthenics, stretching, push-ups, sit-ups, and hand weights; high-protein diet consisting of fish, chicken, and steak.  - Severity: Goal of reaching 225 pounds.    PAST SURGICAL HISTORY:  Appendectomy in 05/2024.    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        Review of Systems   Constitutional:  Negative for chills and fever.   HENT:  Negative for sore throat and trouble swallowing.    Respiratory:  Negative for shortness of breath.    Cardiovascular:  Negative for chest pain, palpitations and leg swelling.   Gastrointestinal:  Negative for abdominal pain.   Skin:  Negative for rash.   All other systems reviewed and are negative.      Objective   /86   Pulse 96   Resp 16   Ht 1.803 m (5' 11\")   Wt 120 kg (265 lb)   SpO2 97%   BMI 36.96 kg/m²       Physical Exam  Constitutional:       Appearance: Normal appearance.   HENT:      Head: Normocephalic.   Eyes:      Conjunctiva/sclera: Conjunctivae normal.   Cardiovascular:      Rate and Rhythm: Normal rate and regular rhythm.      Heart sounds: Normal heart sounds.   Pulmonary:      Effort: Pulmonary effort is normal.      Breath sounds: Normal breath sounds.   Musculoskeletal:      Cervical back: Neck supple.   Skin:     General: Skin is warm and dry.   Neurological:      Mental Status: He is alert.         Assessment & Plan  Well adult health check    Orders:    calcium-mag oxide-vitamin D3 185 mg-50 mg- 2.5 mcg capsule; Take 1 capsule by mouth early in the morning..    Class 2 obesity due to excess calories without serious comorbidity with body mass index (BMI) of 36.0 to 36.9 in adult    Orders:    tirzepatide, weight loss, (Zepbound) 15 mg/0.5 mL injection; Inject 15 " mg under the skin every 7 days.    Primary hypertension    Orders:    losartan (Cozaar) 100 mg tablet; Take 1 tablet (100 mg) by mouth once daily.    Pure hypercholesterolemia    Orders:    rosuvastatin (Crestor) 5 mg tablet; Take 1 tablet (5 mg) by mouth once daily.       Assessment & Plan  1. Hypercholesterolemia: Chronic. .  - Start rosuvastatin 5 mg to reduce LDL levels and cardiovascular risk.  - Goal: Reduce cholesterol level to <200 and LDL level to <100.    2. Elevated calcium levels.  - Reduce calcium supplement intake from twice daily to once daily.  - Monitor for potential kidney stones.    3. Anxiety.  - Counseling on the potential benefits of different medications for anxiety.  - Exercise recommended to help manage stress and anxiety.    4. Obesity: Class II. Weight reduced from 323 pounds to 265 pounds.  - Target weight of 225 pounds set.  - Continue with resistance training and strength training exercises.  - Monitor protein intake to support muscle building.    Follow-up  - Follow-up on July 4th week off.      Willy Calzada, DO   This medical note was created with the assistance of artificial intelligence (AI) for documentation purposes. The content has been reviewed and confirmed by the healthcare provider for accuracy and completeness. Patient consented to the use of audio recording and use of AI during their visit.

## 2025-05-02 NOTE — ASSESSMENT & PLAN NOTE
Orders:    tirzepatide, weight loss, (Zepbound) 15 mg/0.5 mL injection; Inject 15 mg under the skin every 7 days.

## 2025-05-02 NOTE — ASSESSMENT & PLAN NOTE
Orders:    calcium-mag oxide-vitamin D3 185 mg-50 mg- 2.5 mcg capsule; Take 1 capsule by mouth early in the morning..

## 2025-05-16 DIAGNOSIS — E66.812 CLASS 2 OBESITY DUE TO EXCESS CALORIES WITHOUT SERIOUS COMORBIDITY WITH BODY MASS INDEX (BMI) OF 36.0 TO 36.9 IN ADULT: ICD-10-CM

## 2025-05-16 DIAGNOSIS — E66.09 CLASS 2 OBESITY DUE TO EXCESS CALORIES WITHOUT SERIOUS COMORBIDITY WITH BODY MASS INDEX (BMI) OF 36.0 TO 36.9 IN ADULT: ICD-10-CM

## 2025-05-16 PROCEDURE — RXMED WILLOW AMBULATORY MEDICATION CHARGE

## 2025-05-16 RX ORDER — TIRZEPATIDE 15 MG/.5ML
15 INJECTION, SOLUTION SUBCUTANEOUS
Qty: 2 ML | Refills: 3 | Status: SHIPPED | OUTPATIENT
Start: 2025-05-16

## 2025-05-17 ENCOUNTER — PHARMACY VISIT (OUTPATIENT)
Dept: PHARMACY | Facility: CLINIC | Age: 45
End: 2025-05-17
Payer: COMMERCIAL

## 2025-05-23 ENCOUNTER — OFFICE VISIT (OUTPATIENT)
Dept: PRIMARY CARE | Facility: CLINIC | Age: 45
End: 2025-05-23
Payer: COMMERCIAL

## 2025-05-23 VITALS
OXYGEN SATURATION: 98 % | WEIGHT: 270 LBS | SYSTOLIC BLOOD PRESSURE: 122 MMHG | BODY MASS INDEX: 37.66 KG/M2 | DIASTOLIC BLOOD PRESSURE: 90 MMHG | HEART RATE: 95 BPM | RESPIRATION RATE: 22 BRPM | TEMPERATURE: 98.1 F

## 2025-05-23 DIAGNOSIS — J02.9 SORE THROAT: ICD-10-CM

## 2025-05-23 LAB — POC RAPID STREP: NEGATIVE

## 2025-05-23 PROCEDURE — 3080F DIAST BP >= 90 MM HG: CPT | Performed by: NURSE PRACTITIONER

## 2025-05-23 PROCEDURE — 99213 OFFICE O/P EST LOW 20 MIN: CPT | Performed by: NURSE PRACTITIONER

## 2025-05-23 PROCEDURE — 1036F TOBACCO NON-USER: CPT | Performed by: NURSE PRACTITIONER

## 2025-05-23 PROCEDURE — 3074F SYST BP LT 130 MM HG: CPT | Performed by: NURSE PRACTITIONER

## 2025-05-23 PROCEDURE — 87880 STREP A ASSAY W/OPTIC: CPT | Performed by: NURSE PRACTITIONER

## 2025-05-23 ASSESSMENT — ENCOUNTER SYMPTOMS
NAUSEA: 0
VOMITING: 0
SORE THROAT: 1
HEADACHES: 0
FEVER: 0
DIARRHEA: 0
HOARSE VOICE: 1
SLEEP DISTURBANCE: 0
ACTIVITY CHANGE: 0
CONSTIPATION: 0
APPETITE CHANGE: 1
COUGH: 1
CHILLS: 0

## 2025-05-23 NOTE — PROGRESS NOTES
Subjective   Patient ID: Toni Dunn is a 44 y.o. male who presents for Sore Throat (SX started Wednesday).    Cold symptoms x2 days  Started on Wednesday; getting worse each day  Sore throat  Cough  Hoarse voice  Denies any fever or chills  No nasal congestion  Post nasal drip    OTC- tylenol/ cough drops/ severe cold    Sore Throat   This is a new problem. Episode onset: Wednesday. The problem has been gradually worsening. The pain is worse on the left side. There has been no fever. The pain is moderate. Associated symptoms include coughing and a hoarse voice. Pertinent negatives include no congestion, diarrhea, ear pain, headaches or vomiting. Associated symptoms comments: Painful to swallow. He has tried acetaminophen for the symptoms. The treatment provided mild relief.        Review of Systems   Constitutional:  Positive for appetite change. Negative for activity change, chills and fever.   HENT:  Positive for hoarse voice, postnasal drip and sore throat. Negative for congestion and ear pain.    Respiratory:  Positive for cough.    Gastrointestinal:  Negative for constipation, diarrhea, nausea and vomiting.   Neurological:  Negative for headaches.   Psychiatric/Behavioral:  Negative for sleep disturbance.        Objective   /90   Pulse 95   Temp 36.7 °C (98.1 °F) (Temporal)   Resp 22   Wt 122 kg (270 lb)   SpO2 98%   BMI 37.66 kg/m²     Physical Exam  Vitals reviewed.   Constitutional:       General: He is awake. He is not in acute distress.     Appearance: Normal appearance. He is well-developed and well-groomed. He is not ill-appearing or toxic-appearing.   HENT:      Head: Normocephalic.      Right Ear: Tympanic membrane, ear canal and external ear normal.      Left Ear: Tympanic membrane, ear canal and external ear normal.      Nose: Mucosal edema present.      Right Turbinates: Swollen.      Left Turbinates: Swollen.      Mouth/Throat:      Lips: Pink.      Mouth: Mucous membranes are  moist.      Pharynx: Posterior oropharyngeal erythema and postnasal drip present.   Eyes:      Extraocular Movements: Extraocular movements intact.      Conjunctiva/sclera: Conjunctivae normal.      Pupils: Pupils are equal, round, and reactive to light.   Cardiovascular:      Rate and Rhythm: Normal rate and regular rhythm.      Pulses: Normal pulses.      Heart sounds: Normal heart sounds.   Pulmonary:      Effort: Pulmonary effort is normal.      Breath sounds: Normal breath sounds.   Musculoskeletal:      Cervical back: Normal range of motion and neck supple.   Skin:     General: Skin is warm and dry.      Capillary Refill: Capillary refill takes less than 2 seconds.   Neurological:      General: No focal deficit present.      Mental Status: He is alert and oriented to person, place, and time.   Psychiatric:         Mood and Affect: Mood normal.         Behavior: Behavior normal. Behavior is cooperative.         Assessment/Plan   Diagnoses and all orders for this visit:  Sore throat  -     POCT rapid strep A manually resulted  -     Group A Streptococcus, PCR    IO Strep negative; PCR swab to be sent.   Encouraged daily use of Flonase and zyrtec for symptom support  Can use ibuprofen as needed for pain  Warm beverages, throat sprays, salt water gargles and honey can help soothe the throat  Follow up with PCP if not improving over the next several days  ER for any SOB, difficulty breathing, uncontrolled fevers or worsening of symptoms

## 2025-05-24 LAB — S PYO DNA THROAT QL NAA+PROBE: NOT DETECTED

## 2025-05-28 ENCOUNTER — TELEPHONE (OUTPATIENT)
Dept: PRIMARY CARE | Facility: CLINIC | Age: 45
End: 2025-05-28
Payer: COMMERCIAL

## 2025-05-28 NOTE — TELEPHONE ENCOUNTER
Called pt to discuss. Unable to leave VM due to automated call screening. [employer] message sent to call clinic to discuss.

## 2025-06-11 PROCEDURE — RXMED WILLOW AMBULATORY MEDICATION CHARGE

## 2025-06-14 ENCOUNTER — PHARMACY VISIT (OUTPATIENT)
Dept: PHARMACY | Facility: CLINIC | Age: 45
End: 2025-06-14
Payer: COMMERCIAL

## 2025-07-09 PROCEDURE — RXMED WILLOW AMBULATORY MEDICATION CHARGE

## 2025-07-10 ENCOUNTER — APPOINTMENT (OUTPATIENT)
Dept: PHARMACY | Facility: HOSPITAL | Age: 45
End: 2025-07-10
Payer: COMMERCIAL

## 2025-07-10 DIAGNOSIS — E66.813 CLASS 3 SEVERE OBESITY DUE TO EXCESS CALORIES WITHOUT SERIOUS COMORBIDITY WITH BODY MASS INDEX (BMI) OF 45.0 TO 49.9 IN ADULT: ICD-10-CM

## 2025-07-10 DIAGNOSIS — E66.812 CLASS 2 OBESITY DUE TO EXCESS CALORIES WITHOUT SERIOUS COMORBIDITY WITH BODY MASS INDEX (BMI) OF 36.0 TO 36.9 IN ADULT: ICD-10-CM

## 2025-07-10 DIAGNOSIS — E66.09 CLASS 2 OBESITY DUE TO EXCESS CALORIES WITHOUT SERIOUS COMORBIDITY WITH BODY MASS INDEX (BMI) OF 36.0 TO 36.9 IN ADULT: ICD-10-CM

## 2025-07-10 RX ORDER — TIRZEPATIDE 15 MG/.5ML
15 INJECTION, SOLUTION SUBCUTANEOUS
Qty: 2 ML | Refills: 3 | Status: SHIPPED | OUTPATIENT
Start: 2025-07-10

## 2025-07-10 NOTE — ASSESSMENT & PLAN NOTE
Patient's current weight reported as approximately 250 lbs. Has lost 73 lbs. (22.6% of TBW) since starting therapy plan.  Current regimen includes: Zepbound 15mg weekly  Rationale for plan: Patient continues to tolerate maximum dose of Zepbound well, no recent changes or concerns. Weight recently unchanged, but may be due to increased physical activity with yardwork and building muscle mass. Due to tolerating well, indication for further weight loss, and patient preference plan to continue current regimen. Follow-up in 4 months.     Medication Changes:  CONTINUE  Zepbound 15mg once weekly

## 2025-07-10 NOTE — PROGRESS NOTES
"  Clinical Pharmacy Appointment    Patient ID: Chuck Dunn \"Toni\" is a 44 y.o. male who presents for Obesity.    Pt is here for Follow Up appointment.  Referring Provider: Willy Calzada DO - last visit: 5/2/25, next visit: 9/5/25  PCP: Willy Calzada DO     Subjective   HPI  PMH significant for Obesity, HTN.  Special needs/barriers to therapy: None identified    Medication Reconciliation:  No changes    Drug Interactions  No relevant drug interactions were noted.    Medication System Management  Adherence/Organization: No current concerns  Affordability/Accessibility: No current concerns  Patient's preferred pharmacy:     [facility] - [place] [id number] [facility]  [address] [address]  Phone: [phone] Fax: [phone]    [facility]  [address], [address]  [place] [address]  Phone: [phone] Fax: [phone]       WEIGHT MANAGEMENT  BMI Readings from Last 1 Encounters:   05/23/25 37.66 kg/m²   Starting weight: 323 lbs. (5/3/24)   Previous weight: 260 lbs. (3/20/25)  Current weight: 250's lbs.    Weight Goals  Next goal: Weight -20% (258 lbs.)  Maintenance BMI Goal: 18.5 to 24.9  Maintenance Weight Goal: 133 to 178 lbs.  Patient defined goal(s): 235-245 lbs.    Lifestyle  Diet: Ketogenic diet. Adding optifiber to coffee every morning. No recent changes.  Physical Activity: Has been more active outside with yardwork recently.    Pertinent PMH Review  Comorbidities: hypertension  Hx or FH Hx of MTC/MEN2?: No  Pancreatitis?: No  Gastroparesis?: No  Cholecystitis?: No    Non-Pharmacological Therapy  Weight loss techniques attempted:  Surgical intervention: Sleeve gastrectomy, 2017    Pharmacological Therapy  Current Medications:   Zepbound 15mg once weekly (Sundays)   Previous Medications: None     Clarifications to above regimen: None  Adverse Effects: None      Preventative Care  Immunizations Needed: All up-to-date " and documented  Tobacco Use: former smoker, quit 2009       Objective   Allergies[1]  Social History     Social History Narrative    Not on file      Medication Review  Current Outpatient Medications   Medication Instructions    ascorbic acid (VITAMIN C) 1,000 mg, Daily    ASHWAGANDHA EXTRACT ORAL 1,000 mg    calcium-mag oxide-vitamin D3 185 mg-50 mg- 2.5 mcg capsule 1 capsule, oral, Daily    coenzyme Q-10 (CO Q-10) 300 mg    ferrous sulfate (IRON) 325 mg, Daily with breakfast    glucosamine HCl-hyaluronic 1,000-1.65 mg tablet Take by mouth. Glucosamine HCL 1000mg with MSM 1500mg    green tea leaf extract (GREEN TEA ORAL) 400 mg, 2 times daily    losartan (COZAAR) 100 mg, oral, Daily    multivitamin tablet 1 tablet, Daily    omega 3-dha-epa-fish oil (Fish OiL) 1,000 mg (120 mg-180 mg) capsule 1 capsule, Every evening    omeprazole OTC (PRILOSEC OTC) 20 mg, 2 times daily before meals    rosuvastatin (CRESTOR) 5 mg, oral, Daily    saccharomyces boulardii (FLORASTOR) 250 mg, 2 times daily    VITAMIN E ACETATE ORAL 180 mg    Zepbound 15 mg, subcutaneous, Every 7 days      Vitals  BP Readings from Last 2 Encounters:   05/23/25 122/90   05/02/25 124/86     Wt Readings from Last 3 Encounters:   05/23/25 122 kg (270 lb)   05/02/25 120 kg (265 lb)   03/07/25 123 kg (270 lb 12.8 oz)      There is no height or weight on file to calculate BMI.  Labs  A1C  Lab Results   Component Value Date    HGBA1C 5.6 10/27/2021     Metabolic Panel  Lab Results   Component Value Date    EGFR 116 03/07/2025    CREATININE 0.73 03/07/2025     03/07/2025    K 4.8 03/07/2025    CALCIUM 10.6 (H) 03/07/2025     03/07/2025    CO2 27 03/07/2025    BUN 17 03/07/2025     Liver function  Lab Results   Component Value Date    ALT 20 03/07/2025    AST 17 03/07/2025    ALKPHOS 33 (L) 03/07/2025    BILITOT 0.5 03/07/2025     Lipid Panel  Lab Results   Component Value Date    CHOL 229 (H) 03/07/2025    LDLCALC 152 (H) 03/07/2025    TRIG 82  03/07/2025    HDL 58 03/07/2025     Urine Microalbumin  Lab Results   Component Value Date    MICROALBCREA 7.3 06/20/2024       Assessment/Plan   Problem List Items Addressed This Visit       Class 3 severe obesity due to excess calories without serious comorbidity with body mass index (BMI) of 45.0 to 49.9 in adult    Patient's current weight reported as approximately 250 lbs. Has lost 73 lbs. (22.6% of TBW) since starting therapy plan.  Current regimen includes: Zepbound 15mg weekly  Rationale for plan: Patient continues to tolerate maximum dose of Zepbound well, no recent changes or concerns. Weight recently unchanged, but may be due to increased physical activity with yardwork and building muscle mass. Due to tolerating well, indication for further weight loss, and patient preference plan to continue current regimen. Follow-up in 4 months.     Medication Changes:  CONTINUE  Zepbound 15mg once weekly          Relevant Medications    tirzepatide, weight loss, (Zepbound) 15 mg/0.5 mL injection    Other Relevant Orders    Referral to Clinical Pharmacy     Other Visit Diagnoses         Class 2 obesity due to excess calories without serious comorbidity with body mass index (BMI) of 36.0 to 36.9 in adult        Relevant Medications    tirzepatide, weight loss, (Zepbound) 15 mg/0.5 mL injection          Patient Education:  Counseled patient on relevant medication mechanisms of action, expectations, side effects, duration of therapy, contraindications, administration, and monitoring parameters.  All questions and concerns addressed. Contact pharmacist with any further questions or concerns prior to next appointment.    Clinical Pharmacist follow-up: 11/6/25 at 3:20pm, Telehealth visit  Patient is not followed in [facility].    Thank you,  Betty Caruso, [facility]  Clinical Pharmacy Specialist  [phone]    Continue all meds under the continuation of care with the referring provider and clinical pharmacy team.  Verbal  consent to manage patient's drug therapy was obtained from the patient. They were informed they may decline to participate or withdraw from participation in pharmacy services at any time.         [1]   Allergies  Allergen Reactions    Bee Venom Protein (Honey Bee) Swelling    Venom-Wasp Swelling

## 2025-07-12 ENCOUNTER — PHARMACY VISIT (OUTPATIENT)
Dept: PHARMACY | Facility: CLINIC | Age: 45
End: 2025-07-12
Payer: COMMERCIAL

## 2025-08-03 PROCEDURE — RXMED WILLOW AMBULATORY MEDICATION CHARGE

## 2025-08-09 ENCOUNTER — PHARMACY VISIT (OUTPATIENT)
Dept: PHARMACY | Facility: CLINIC | Age: 45
End: 2025-08-09
Payer: COMMERCIAL

## 2025-09-01 PROCEDURE — RXMED WILLOW AMBULATORY MEDICATION CHARGE

## 2025-09-02 ENCOUNTER — PHARMACY VISIT (OUTPATIENT)
Dept: PHARMACY | Facility: CLINIC | Age: 45
End: 2025-09-02
Payer: COMMERCIAL

## 2025-09-05 ENCOUNTER — APPOINTMENT (OUTPATIENT)
Dept: PRIMARY CARE | Facility: CLINIC | Age: 45
End: 2025-09-05
Payer: COMMERCIAL

## 2025-09-05 PROBLEM — E78.5 DYSLIPIDEMIA: Status: ACTIVE | Noted: 2025-09-05

## 2025-09-07 LAB
ALBUMIN/CREAT UR: NORMAL
ANABASINE UR-MCNC: NORMAL NG/ML
ANION GAP SERPL CALCULATED.4IONS-SCNC: 10 MMOL/L (CALC) (ref 7–17)
BUN SERPL-MCNC: 16 MG/DL (ref 7–25)
BUN/CREAT SERPL: NORMAL (CALC) (ref 6–22)
CALCIUM SERPL-MCNC: 9.7 MG/DL (ref 8.6–10.3)
CHLORIDE SERPL-SCNC: 107 MMOL/L (ref 98–110)
CHOLEST SERPL-MCNC: 157 MG/DL
CHOLEST/HDLC SERPL: 2.7 (CALC)
CO2 SERPL-SCNC: 25 MMOL/L (ref 20–32)
COTININE UR-MCNC: NORMAL NG/ML
CREAT SERPL-MCNC: 0.84 MG/DL (ref 0.6–1.29)
CREAT UR-MCNC: NORMAL MG/DL
EGFRCR SERPLBLD CKD-EPI 2021: 111 ML/MIN/1.73M2
EST. AVERAGE GLUCOSE BLD GHB EST-MCNC: 100 MG/DL
EST. AVERAGE GLUCOSE BLD GHB EST-SCNC: 5.5 MMOL/L
GLUCOSE SERPL-MCNC: 85 MG/DL (ref 65–139)
HBA1C MFR BLD: 5.1 %
HDLC SERPL-MCNC: 59 MG/DL
LDLC SERPL CALC-MCNC: 78 MG/DL (CALC)
MICROALBUMIN UR-MCNC: NORMAL
NICOTINE UR-MCNC: NORMAL NG/ML
NONHDLC SERPL-MCNC: 98 MG/DL (CALC)
NORCOTININE UR-MCNC: NORMAL NG/ML
POTASSIUM SERPL-SCNC: 4.6 MMOL/L (ref 3.5–5.3)
QUEST 3-OH-COTININE, URINE: NORMAL
QUEST NORNICOTINE,UR: NORMAL
SODIUM SERPL-SCNC: 142 MMOL/L (ref 135–146)
TRIGL SERPL-MCNC: 114 MG/DL

## 2025-10-17 ENCOUNTER — APPOINTMENT (OUTPATIENT)
Dept: PRIMARY CARE | Facility: CLINIC | Age: 45
End: 2025-10-17
Payer: COMMERCIAL

## 2025-11-06 ENCOUNTER — APPOINTMENT (OUTPATIENT)
Dept: PHARMACY | Facility: HOSPITAL | Age: 45
End: 2025-11-06
Payer: COMMERCIAL

## 2026-01-09 ENCOUNTER — APPOINTMENT (OUTPATIENT)
Dept: PRIMARY CARE | Facility: CLINIC | Age: 46
End: 2026-01-09
Payer: COMMERCIAL

## (undated) DEVICE — SUTURE, VICRYL, 0, 27 IN, UR-6, VIOLET

## (undated) DEVICE — STAPLER, SKIN, PLUS, WIDE, 35

## (undated) DEVICE — Device

## (undated) DEVICE — SOLUTION, IRRIGATION, SODIUM CHLORIDE 0.9%, 1000 ML, POUR BOTTLE

## (undated) DEVICE — RETRIEVAL SYSTEM, MONARCH, 10MM DISP ENDOSCOPIC

## (undated) DEVICE — SOLUTION, IRRIGATION, STERILE WATER, 1000 ML, POUR BOTTLE

## (undated) DEVICE — ASSEMBLY, STRYKER FLOW 2, SUCTION IRRIGATOR, WITH TIP

## (undated) DEVICE — SUTURE, VICRYL, 3-0, 27 IN, SH

## (undated) DEVICE — SUTURE, VICRYL, 2-0, 18 IN, UNDYED

## (undated) DEVICE — DRESSING, GAUZE, 16 PLY, 4 X 4 IN, STERILE

## (undated) DEVICE — GLOVE, SURGICAL, PROTEXIS PI , 7.5, PF, LF

## (undated) DEVICE — STAPLER, PROXIMATE SKIN, REGULAR 35, STERILE

## (undated) DEVICE — SUTURE, PROLENE, 2-0, 36 IN, SH, DA, BLUE

## (undated) DEVICE — TROCAR SYSTEM, BALLOON, KII GELPORT, 12 X 100MM

## (undated) DEVICE — SPONGE, DISSECTOR, PEANUT, 3/8, STERILE 5 FOAM HOLDER"

## (undated) DEVICE — SUTURE, VICRYL, 4-0, 18 IN, UNDYED BR PS-2

## (undated) DEVICE — DRESSING, GAUZE, SUPER KERLIX, 6X6

## (undated) DEVICE — SUPPORTER, ATHLETIC, ADULT, LARGE

## (undated) DEVICE — SOLUTION, IRRIGATION, USP, SODIUM CHLORIDE 0.9%, 3000 ML, BAG

## (undated) DEVICE — SCISSORS, METZ, CURVED, 3/4 BLADE

## (undated) DEVICE — DEVICE, SUTURE, ENDOCLOSE, TROCAR

## (undated) DEVICE — PREP, SKIN, BACTOSHEILD, 4%, 4OZ

## (undated) DEVICE — TRAY, SURESTEP, SILICONE DRAINAGE BAG, STATLOCK, 16FR

## (undated) DEVICE — TROCAR, OPTICAL, BLADELESS, 12MM, THREADED, 100MM LENGTH

## (undated) DEVICE — DRAIN, PENROSE, 0.25 X 12 IN

## (undated) DEVICE — TOWEL PACK, STERILE, 4/PACK, BLUE

## (undated) DEVICE — PREP, SCRUB, SKIN, HIBICLENS, 32 OZ

## (undated) DEVICE — SUTURE, PROLENE, 0, 30 IN, CT-2, BLUE

## (undated) DEVICE — STAPLER, ENDO ECHELON 45MM RELOAD, WHITE, REUSABLE

## (undated) DEVICE — APPLIER,  LIGACLIP, ENDO ROTATE, ROTATING, 10MM 20M/L, DISP

## (undated) DEVICE — PREP TRAY, SKIN, SCRUB, DISP, STERILE

## (undated) DEVICE — SUTURE, MONOCRYL, 4-0, 27 IN, PS-2, UNDYED

## (undated) DEVICE — DRAPE, SHEET, ENDOSCOPY, GENERAL, FENESTRATED, ARMBOARD COVER, 98 X 123.5 IN, DISPOSABLE, LF, STERILE

## (undated) DEVICE — TROCAR, KII OPTICAL BLADELESS 5MM Z THREAD 100MM LNGTH

## (undated) DEVICE — STAPLER, ECHELON 3000, 45MM STD

## (undated) DEVICE — PREP TRAY, VAGINAL

## (undated) DEVICE — LIGASURE, V SEALER/DIVIDER  5MM BLUNT TIP

## (undated) DEVICE — GRASPER, LAPAROSCOPIC, DIRECT DRIVE, 5MM X35CM, DISP